# Patient Record
Sex: FEMALE | Race: WHITE | Employment: UNEMPLOYED | URBAN - METROPOLITAN AREA
[De-identification: names, ages, dates, MRNs, and addresses within clinical notes are randomized per-mention and may not be internally consistent; named-entity substitution may affect disease eponyms.]

---

## 2023-03-29 ENCOUNTER — APPOINTMENT (OUTPATIENT)
Dept: CT IMAGING | Age: 78
End: 2023-03-29
Payer: COMMERCIAL

## 2023-03-29 ENCOUNTER — APPOINTMENT (OUTPATIENT)
Dept: GENERAL RADIOLOGY | Age: 78
End: 2023-03-29
Payer: COMMERCIAL

## 2023-03-29 ENCOUNTER — HOSPITAL ENCOUNTER (OUTPATIENT)
Age: 78
Setting detail: OBSERVATION
Discharge: HOME | End: 2023-03-31
Attending: EMERGENCY MEDICINE | Admitting: INTERNAL MEDICINE
Payer: COMMERCIAL

## 2023-03-29 DIAGNOSIS — U07.1 COVID: Primary | ICD-10-CM

## 2023-03-29 LAB
ALBUMIN SERPL-MCNC: 4.3 G/DL (ref 3.5–5.2)
ALP SERPL-CCNC: 58 U/L (ref 35–104)
ALT SERPL-CCNC: 16 U/L (ref 0–32)
AMPHET UR QL SCN: NOT DETECTED
ANION GAP SERPL CALCULATED.3IONS-SCNC: 11 MMOL/L (ref 7–16)
APAP SERPL-MCNC: <5 MCG/ML (ref 10–30)
AST SERPL-CCNC: 20 U/L (ref 0–31)
BACTERIA URNS QL MICRO: ABNORMAL /HPF
BARBITURATES UR QL SCN: NOT DETECTED
BASOPHILS # BLD: 0.01 E9/L (ref 0–0.2)
BASOPHILS NFR BLD: 0.1 % (ref 0–2)
BENZODIAZ UR QL SCN: NOT DETECTED
BILIRUB SERPL-MCNC: 0.7 MG/DL (ref 0–1.2)
BILIRUB UR QL STRIP: NEGATIVE
BUN SERPL-MCNC: 10 MG/DL (ref 6–23)
CALCIUM SERPL-MCNC: 9.3 MG/DL (ref 8.6–10.2)
CANNABINOIDS UR QL SCN: NOT DETECTED
CHLORIDE SERPL-SCNC: 103 MMOL/L (ref 98–107)
CLARITY UR: CLEAR
CO2 SERPL-SCNC: 26 MMOL/L (ref 22–29)
COCAINE UR QL SCN: NOT DETECTED
COLOR UR: YELLOW
CREAT SERPL-MCNC: 0.6 MG/DL (ref 0.5–1)
DRUG SCREEN COMMENT UR-IMP: NORMAL
EKG ATRIAL RATE: 83 BPM
EKG P AXIS: 141 DEGREES
EKG P-R INTERVAL: 132 MS
EKG Q-T INTERVAL: 354 MS
EKG QRS DURATION: 90 MS
EKG QTC CALCULATION (BAZETT): 415 MS
EKG R AXIS: 125 DEGREES
EKG T AXIS: 139 DEGREES
EKG VENTRICULAR RATE: 83 BPM
EOSINOPHIL # BLD: 0.01 E9/L (ref 0.05–0.5)
EOSINOPHIL NFR BLD: 0.1 % (ref 0–6)
ERYTHROCYTE [DISTWIDTH] IN BLOOD BY AUTOMATED COUNT: 12.8 FL (ref 11.5–15)
ETHANOLAMINE SERPL-MCNC: <10 MG/DL (ref 0–0.08)
FENTANYL SCREEN, URINE: NOT DETECTED
GLUCOSE BLD-MCNC: 96 MG/DL
GLUCOSE SERPL-MCNC: 96 MG/DL (ref 74–99)
GLUCOSE UR STRIP-MCNC: NEGATIVE MG/DL
HCT VFR BLD AUTO: 38.4 % (ref 34–48)
HGB BLD-MCNC: 13.2 G/DL (ref 11.5–15.5)
HGB UR QL STRIP: NEGATIVE
IMM GRANULOCYTES # BLD: 0.04 E9/L
IMM GRANULOCYTES NFR BLD: 0.5 % (ref 0–5)
INFLUENZA A BY PCR: NOT DETECTED
INFLUENZA B BY PCR: NOT DETECTED
KETONES UR STRIP-MCNC: 15 MG/DL
LACTATE BLDV-SCNC: 1.4 MMOL/L (ref 0.5–2.2)
LEUKOCYTE ESTERASE UR QL STRIP: ABNORMAL
LYMPHOCYTES # BLD: 0.9 E9/L (ref 1.5–4)
LYMPHOCYTES NFR BLD: 12 % (ref 20–42)
MCH RBC QN AUTO: 30.8 PG (ref 26–35)
MCHC RBC AUTO-ENTMCNC: 34.4 % (ref 32–34.5)
MCV RBC AUTO: 89.7 FL (ref 80–99.9)
METHADONE UR QL SCN: NOT DETECTED
MONOCYTES # BLD: 0.48 E9/L (ref 0.1–0.95)
MONOCYTES NFR BLD: 6.4 % (ref 2–12)
NEUTROPHILS # BLD: 6.08 E9/L (ref 1.8–7.3)
NEUTS SEG NFR BLD: 80.9 % (ref 43–80)
NITRITE UR QL STRIP: NEGATIVE
OPIATES UR QL SCN: NOT DETECTED
OXYCODONE URINE: NOT DETECTED
PCP UR QL SCN: NOT DETECTED
PH UR STRIP: 7 [PH] (ref 5–9)
PLATELET # BLD AUTO: 204 E9/L (ref 130–450)
PMV BLD AUTO: 9.9 FL (ref 7–12)
POTASSIUM SERPL-SCNC: 3.6 MMOL/L (ref 3.5–5)
PROT SERPL-MCNC: 7.4 G/DL (ref 6.4–8.3)
PROT UR STRIP-MCNC: NEGATIVE MG/DL
RBC # BLD AUTO: 4.28 E12/L (ref 3.5–5.5)
RBC #/AREA URNS HPF: ABNORMAL /HPF (ref 0–2)
SALICYLATES SERPL-MCNC: <0.3 MG/DL (ref 0–30)
SARS-COV-2 RDRP RESP QL NAA+PROBE: DETECTED
SODIUM SERPL-SCNC: 140 MMOL/L (ref 132–146)
SP GR UR STRIP: <=1.005 (ref 1–1.03)
TRICYCLIC ANTIDEPRESSANTS SCREEN SERUM: NEGATIVE NG/ML
TROPONIN, HIGH SENSITIVITY: 19 NG/L (ref 0–9)
TROPONIN, HIGH SENSITIVITY: 19 NG/L (ref 0–9)
UROBILINOGEN UR STRIP-ACNC: 0.2 E.U./DL
WBC # BLD: 7.5 E9/L (ref 4.5–11.5)
WBC #/AREA URNS HPF: ABNORMAL /HPF (ref 0–5)

## 2023-03-29 PROCEDURE — 87635 SARS-COV-2 COVID-19 AMP PRB: CPT

## 2023-03-29 PROCEDURE — 87502 INFLUENZA DNA AMP PROBE: CPT

## 2023-03-29 PROCEDURE — 85025 COMPLETE CBC W/AUTO DIFF WBC: CPT

## 2023-03-29 PROCEDURE — 93010 ELECTROCARDIOGRAM REPORT: CPT | Performed by: INTERNAL MEDICINE

## 2023-03-29 PROCEDURE — 71045 X-RAY EXAM CHEST 1 VIEW: CPT

## 2023-03-29 PROCEDURE — 84484 ASSAY OF TROPONIN QUANT: CPT

## 2023-03-29 PROCEDURE — 83605 ASSAY OF LACTIC ACID: CPT

## 2023-03-29 PROCEDURE — 70450 CT HEAD/BRAIN W/O DYE: CPT

## 2023-03-29 PROCEDURE — 80053 COMPREHEN METABOLIC PANEL: CPT

## 2023-03-29 PROCEDURE — 81001 URINALYSIS AUTO W/SCOPE: CPT

## 2023-03-29 PROCEDURE — 36415 COLL VENOUS BLD VENIPUNCTURE: CPT

## 2023-03-29 PROCEDURE — 80179 DRUG ASSAY SALICYLATE: CPT

## 2023-03-29 PROCEDURE — 82077 ASSAY SPEC XCP UR&BREATH IA: CPT

## 2023-03-29 PROCEDURE — 80143 DRUG ASSAY ACETAMINOPHEN: CPT

## 2023-03-29 PROCEDURE — 93005 ELECTROCARDIOGRAM TRACING: CPT

## 2023-03-29 PROCEDURE — 99285 EMERGENCY DEPT VISIT HI MDM: CPT

## 2023-03-29 PROCEDURE — G0378 HOSPITAL OBSERVATION PER HR: HCPCS

## 2023-03-29 PROCEDURE — 80307 DRUG TEST PRSMV CHEM ANLYZR: CPT

## 2023-03-29 RX ORDER — ACETAMINOPHEN 500 MG
500 TABLET ORAL EVERY 4 HOURS PRN
Status: DISCONTINUED | OUTPATIENT
Start: 2023-03-29 | End: 2023-03-31 | Stop reason: HOSPADM

## 2023-03-29 RX ORDER — LANOLIN ALCOHOL/MO/W.PET/CERES
3 CREAM (GRAM) TOPICAL NIGHTLY PRN
Status: DISCONTINUED | OUTPATIENT
Start: 2023-03-29 | End: 2023-03-31 | Stop reason: HOSPADM

## 2023-03-29 ASSESSMENT — ENCOUNTER SYMPTOMS
VOICE CHANGE: 0
TROUBLE SWALLOWING: 0
NAUSEA: 0
PHOTOPHOBIA: 0
RHINORRHEA: 1
ABDOMINAL PAIN: 0
VOMITING: 0
DIARRHEA: 0
SHORTNESS OF BREATH: 0
WHEEZING: 0
COUGH: 1
BACK PAIN: 0

## 2023-03-29 NOTE — ED NOTES
Charge nurse Latisha Russ RN and Resident Dr. Abel Soliz notified of Pt being positive for COVID-19 and needing a room in the ED. Charge nurse stating no rooms are available  at the moment and they will notify this RN when a room is available. Isolation precautions taken at this moment.       Desean Herbert RN  03/29/23 5438

## 2023-03-29 NOTE — ED NOTES
Pt denying SI and HI. Stating she is confused and that's why she needs help. Does not know how to use her phone and computer that she's been using for years. Pt ANO x 2.       Polo Winn RN  03/29/23 1113

## 2023-03-29 NOTE — ED NOTES
Ambulated Pt in hallway. Stable gait. No signs of distress or SOB. Pt dizzy upon standing for 10 seconds then no dizzy anymore. Educated Pt on sitting on edge of the bed for 5 minutes then standing. HR  SpO2 96-98% RA while ambulating.       Kingston Corral RN  03/29/23 6942

## 2023-03-29 NOTE — ED PROVIDER NOTES
SCREEN   Result Value Ref Range    Amphetamine Screen, Urine NOT DETECTED Negative <1000 ng/mL    Barbiturate Screen, Ur NOT DETECTED Negative < 200 ng/mL    Benzodiazepine Screen, Urine NOT DETECTED Negative < 200 ng/mL    Cannabinoid Scrn, Ur NOT DETECTED Negative < 50ng/mL    Cocaine Metabolite Screen, Urine NOT DETECTED Negative < 300 ng/mL    Opiate Scrn, Ur NOT DETECTED Negative < 300ng/mL    PCP Screen, Urine NOT DETECTED Negative < 25 ng/mL    Methadone Screen, Urine NOT DETECTED Negative <300 ng/mL    Oxycodone Urine NOT DETECTED Negative <100 ng/mL    FENTANYL SCREEN, URINE NOT DETECTED Negative <1 ng/mL    Drug Screen Comment: see below    Serum Drug Screen   Result Value Ref Range    Ethanol Lvl <10 mg/dL    Acetaminophen Level <5.0 (L) 10.0 - 36.5 mcg/mL    Salicylate, Serum <4.0 0.0 - 30.0 mg/dL    TCA Scrn NEGATIVE Cutoff:300 ng/mL   Troponin   Result Value Ref Range    Troponin, High Sensitivity 19 (H) 0 - 9 ng/L   Urinalysis   Result Value Ref Range    Color, UA Yellow Straw/Yellow    Clarity, UA Clear Clear    Glucose, Ur Negative Negative mg/dL    Bilirubin Urine Negative Negative    Ketones, Urine 15 (A) Negative mg/dL    Specific Gravity, UA <=1.005 1.005 - 1.030    Blood, Urine Negative Negative    pH, UA 7.0 5.0 - 9.0    Protein, UA Negative Negative mg/dL    Urobilinogen, Urine 0.2 <2.0 E.U./dL    Nitrite, Urine Negative Negative    Leukocyte Esterase, Urine MODERATE (A) Negative   Lactic Acid   Result Value Ref Range    Lactic Acid 1.4 0.5 - 2.2 mmol/L   Microscopic Urinalysis   Result Value Ref Range    WBC, UA 2-5 0 - 5 /HPF    RBC, UA NONE 0 - 2 /HPF    Bacteria, UA RARE (A) None Seen /HPF   Troponin   Result Value Ref Range    Troponin, High Sensitivity 19 (H) 0 - 9 ng/L   POCT Glucose   Result Value Ref Range    Glucose 96 mg/dL   EKG 12 Lead   Result Value Ref Range    Ventricular Rate 83 BPM    Atrial Rate 83 BPM    P-R Interval 132 ms    QRS Duration 90 ms    Q-T Interval 354 ms QTc Calculation (Bazett) 415 ms    P Axis 141 degrees    R Axis 125 degrees    T Axis 139 degrees       RADIOLOGY:  CT Head W/O Contrast   Final Result   No acute intracranial abnormality. Mild cerebral atrophy, likely age related. Moderate periventricular and subcortical white matter hypodensity consistent   with demyelination/dysmyelination and/or microangiopathic ischemic changes of   aging. Probable remote lacunar infarct left basal ganglia. Minimal maxillary and left sphenoid mucosal sinus disease. XR CHEST PORTABLE   Final Result   No acute process. ------------------------- NURSING NOTES AND VITALS REVIEWED ---------------------------  Date / Time Roomed:  3/29/2023 10:54 AM  ED Bed Assignment:  10/10    The nursing notes within the ED encounter and vital signs as below have been reviewed. Patient Vitals for the past 24 hrs:   BP Temp Pulse Resp SpO2 Height Weight   03/29/23 2300 (!) 146/85 -- 79 18 98 % -- --   03/29/23 1915 (!) 142/81 -- 80 18 98 % -- --   03/29/23 1833 (!) 142/96 -- 88 18 98 % -- --   03/29/23 1334 -- -- -- -- -- 5' 3\" (1.6 m) 120 lb (54.4 kg)   03/29/23 1333 (!) 153/82 -- 87 18 98 % -- --   03/29/23 1115 (!) 176/88 97.8 °F (36.6 °C) 86 14 96 % -- --       Oxygen Saturation Interpretation: Normal    ------------------------------------------ PROGRESS NOTES ------------------------------------------  Re-evaluation(s):  Patients symptoms show no change  Repeat physical examination is not changed    Counseling:  I have spoken with the patient and discussed todays results, in addition to providing specific details for the plan of care and counseling regarding the diagnosis and prognosis. Their questions are answered at this time and they are agreeable with the plan of admission.    --------------------------------- ADDITIONAL PROVIDER NOTES ---------------------------------  Consultations:  Spoke with Dr. Erna Munguia. Discussed case.   They will

## 2023-03-29 NOTE — ED NOTES
Attempts:  [] Reports:   [x] Denies    C-SSRS Screening Completed by RN: Current Suicide Risk:  [x] No Risk [] Low [] Moderate [] High    Homicidal Ideations:  [] Reports:   [] Past [] Present   [x] Denies     Self Injurious/Self Mutilation Behaviors:  [] Reports:    [] Past [] Present   [] Denies    Hallucinations/Delusions:  [x] Reports:   possibly  [] Denies     Substance Use/Alcohol Use/Addiction:  [] Reports:   [x] Denies   [x] SBIRT Screen Complete. Current or Past Substance Abuse Treatment:  [] Yes, When and Where:  [x] No    Current or Past Mental Health Treatment:  [] Yes, When and Where:  [x] No    Legal Issues:  []  Yes (Specify)  [x]  No    Access to Weapons:  []  Yes (Specify)  [x]  No    Trauma History:  [] Reports:  [x] Denies     Living Situation:  alone at home     Employment:  retired    Education Level:  college    Violence Risk Screening:        Have you ever thought about hurting someone? [x]  No  []  Yes (Ask the questions listed below)   When? Did you follow through with the thoughts? [] No     [] Yes- When and what happened? 2.  Have you ever threatened anyone? [x]  No  []  Yes (Ask the questions listed below)   When and what happened? Have you ever threatened someone with a gun, knife or other weapon? []  No  []  Yes - When and what happened? 2. Have you ever had an order of protection taken out against you? []  Yes [x]  No  3. Have you ever been arrested due to violence? []  Yes [x]  No  4. Have you ever been cruel to animals?  []  Yes [x]  No    After consideration of C-SSRS screening results, C-SSRS assessments, and this professional's assessment the patient's overall suicide risk assessed to be:  [x] No Risk  [] Low   [] Moderate   [] High     [x] Discussed current suicide risk, protective and risk factors with RN and ED Physician     Disposition:  [] Home:   [] Outpatient Provider:   [] Crisis Unit:   [] Inpatient Psychiatric Unit:  [x] Other: pt is being medically evaluated                    Graylon Jacoby, W  03/29/23 6534

## 2023-03-30 PROCEDURE — G0378 HOSPITAL OBSERVATION PER HR: HCPCS

## 2023-03-30 NOTE — H&P
°C) (Temporal)   Resp 16   Ht 5' 3\" (1.6 m)   Wt 120 lb (54.4 kg)   SpO2 99%   BMI 21.26 kg/m²     General:  Awake, alert, oriented X 3. No acute distress noted  She is somewhat thin built  Answered all questions appropriately  HEENT:  Normocephalic, atraumatic. Pupils equal, round, reactive to light. No scleral icterus. No conjunctival injection. No nasal discharge. Neck:  Supple  Heart:  RRR, no murmurs, gallops, rubs  Lungs:  CTA bilaterally, bilat symmetrical expansion, no wheeze, rales, or rhonchi  Abdomen:   Bowel sounds present, soft, nontender, no masses, no organomegaly, no peritoneal signs  Extremities:  No clubbing, cyanosis, or edema  Skin:  Warm and dry, no open lesions or rash  Neuro:  Cranial nerves 2-12 intact, no focal deficits  Breast: deferred  Rectal: deferred  Genitalia:  deferred    LABS:        ASSESSMENT:      Principal Problem:    COVID-19  Altered mental status likely from worsening dementia        PLAN:  No treatment required for COVID  Psychiatric consult  DC planning  Questions answered to her satisfaction    Maira Bellamy MD  1:27 PM  3/30/2023

## 2023-03-30 NOTE — CARE COORDINATION
Care coordination:  69year old admitted with altered mental status after found outside her home confused. Tested positive for COVID. Place in observation . SW spoke to daughter Elly Anguiano  [phone] . [place] [facility] [address] now. She is medical POA. She reports patient was diagnosed with temporal frontal dementia in 2021 with symptoms worsening over last 6-9 months. She is physically independent. Friends check on her regularly. Patient does not drive. No DME or DANG or HHC in [facility]. She follows at [place] [address] of neurology. Her local PCP is Lydia Price. 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 Patient son is financial POA and lives in [place]. Another daughter lives in [place]. Family have decided that patient can no longer live alone. They are in process of seeking  placement in a memory care unit possibly with SOV. They will take the patient home at discharge and provide 24 hour supervision until placed. Daughter was provided private duty care giver list .    Will follow.

## 2023-03-30 NOTE — CONSULTS
Patient chart reviewed. She is being treated by [facility] neurology who diagnosed the patient with frontotemporal dementia, will defer to their expertise. Agree with social work plan for memory care and discharge home with 24/7 care. Please re-consult if there is any acute need for inpatient psychiatric intervention; if the patient is suicidal, homicidal psychotic or manic.

## 2023-03-31 VITALS
RESPIRATION RATE: 16 BRPM | OXYGEN SATURATION: 100 % | HEART RATE: 88 BPM | SYSTOLIC BLOOD PRESSURE: 160 MMHG | HEIGHT: 63 IN | TEMPERATURE: 96.8 F | WEIGHT: 120 LBS | DIASTOLIC BLOOD PRESSURE: 88 MMHG | BODY MASS INDEX: 21.26 KG/M2

## 2023-03-31 PROCEDURE — G0378 HOSPITAL OBSERVATION PER HR: HCPCS

## 2023-03-31 NOTE — PROGRESS NOTES
Discussed AVS with pt and daughter. All questions answered. PIV removed and pt tolerated well.
Rina barajas served Psych consult.
Current  Protein (g/day): 1.2-1.4g/kgxCBW=65-75g  Method Used for Fluid Requirements: 1 ml/kcal  Fluid (ml/day): 7513-4966    Nutrition Diagnosis:   Inadequate oral intake related to cognitive or neurological impairment as evidenced by intake 0-25%, poor intake prior to admission    Nutrition Interventions:   Food and/or Nutrient Delivery: Continue Current Diet, Start Oral Nutrition Supplement (Ensure BID ; magic cup once/day)  Nutrition Education/Counseling: Education not indicated  Coordination of Nutrition Care: Continue to monitor while inpatient       Goals:     Goals: PO intake 50% or greater, by next RD assessment       Nutrition Monitoring and Evaluation:   Behavioral-Environmental Outcomes: None Identified  Food/Nutrient Intake Outcomes: Food and Nutrient Intake, Supplement Intake  Physical Signs/Symptoms Outcomes: Biochemical Data, Nutrition Focused Physical Findings, Skin, Weight, Chewing or Swallowing, GI Status, Fluid Status or Edema    Discharge Planning:     Too soon to determine     Tariq GRICELDA Pan, LD  Contact: [phone]

## 2024-01-01 ENCOUNTER — APPOINTMENT (OUTPATIENT)
Dept: GENERAL RADIOLOGY | Age: 79
DRG: 884 | End: 2024-01-01
Payer: MEDICARE

## 2024-01-01 ENCOUNTER — HOSPITAL ENCOUNTER (INPATIENT)
Age: 79
LOS: 2 days | Discharge: NURSING, REHAB OR LONG-TERM CARE | DRG: 884 | End: 2024-10-16
Attending: EMERGENCY MEDICINE | Admitting: FAMILY MEDICINE
Payer: MEDICARE

## 2024-01-01 ENCOUNTER — APPOINTMENT (OUTPATIENT)
Dept: MRI IMAGING | Age: 79
DRG: 884 | End: 2024-01-01
Payer: MEDICARE

## 2024-01-01 ENCOUNTER — APPOINTMENT (OUTPATIENT)
Dept: CT IMAGING | Age: 79
DRG: 884 | End: 2024-01-01
Payer: MEDICARE

## 2024-01-01 VITALS
HEIGHT: 63 IN | DIASTOLIC BLOOD PRESSURE: 90 MMHG | HEART RATE: 93 BPM | RESPIRATION RATE: 18 BRPM | TEMPERATURE: 97 F | OXYGEN SATURATION: 98 % | BODY MASS INDEX: 18.09 KG/M2 | WEIGHT: 102.1 LBS | SYSTOLIC BLOOD PRESSURE: 168 MMHG

## 2024-01-01 DIAGNOSIS — S09.90XA INJURY OF HEAD, INITIAL ENCOUNTER: Primary | ICD-10-CM
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
## 2024-01-01 DIAGNOSIS — Z86.59 HISTORY OF DEMENTIA: ICD-10-CM

## 2024-01-01 LAB
ALBUMIN SERPL-MCNC: 4.4 G/DL (ref 3.5–5.2)
ALP SERPL-CCNC: 71 U/L (ref 35–104)
ALT SERPL-CCNC: 16 U/L (ref 0–32)
ANION GAP SERPL CALCULATED.3IONS-SCNC: 10 MMOL/L (ref 7–16)
ANION GAP SERPL CALCULATED.3IONS-SCNC: 11 MMOL/L (ref 7–16)
ANION GAP SERPL CALCULATED.3IONS-SCNC: 13 MMOL/L (ref 7–16)
APPEARANCE CSF: CLEAR
APPEARANCE CSF: CLEAR
AST SERPL-CCNC: 28 U/L (ref 0–31)
BACTERIA URNS QL MICRO: ABNORMAL
BASOPHILS # BLD: 0.03 K/UL (ref 0–0.2)
BASOPHILS # BLD: 0.04 K/UL (ref 0–0.2)
BASOPHILS # BLD: 0.06 K/UL (ref 0–0.2)
BASOPHILS NFR BLD: 0 % (ref 0–2)
BASOPHILS NFR BLD: 0 % (ref 0–2)
BASOPHILS NFR BLD: 1 % (ref 0–2)
BILIRUB SERPL-MCNC: 0.6 MG/DL (ref 0–1.2)
BILIRUB UR QL STRIP: NEGATIVE
BUN SERPL-MCNC: 16 MG/DL (ref 6–23)
BUN SERPL-MCNC: 17 MG/DL (ref 6–23)
BUN SERPL-MCNC: 20 MG/DL (ref 6–23)
CALCIUM SERPL-MCNC: 10.2 MG/DL (ref 8.6–10.2)
CALCIUM SERPL-MCNC: 9.2 MG/DL (ref 8.6–10.2)
CALCIUM SERPL-MCNC: 9.9 MG/DL (ref 8.6–10.2)
CHLORIDE SERPL-SCNC: 101 MMOL/L (ref 98–107)
CHLORIDE SERPL-SCNC: 104 MMOL/L (ref 98–107)
CHLORIDE SERPL-SCNC: 107 MMOL/L (ref 98–107)
CLARITY UR: CLEAR
CLOT CHECK: ABNORMAL
CLOT CHECK: NORMAL
CO2 SERPL-SCNC: 22 MMOL/L (ref 22–29)
CO2 SERPL-SCNC: 22 MMOL/L (ref 22–29)
CO2 SERPL-SCNC: 24 MMOL/L (ref 22–29)
COLOR CSF: COLORLESS
COLOR CSF: COLORLESS
COLOR UR: YELLOW
CREAT SERPL-MCNC: 0.8 MG/DL (ref 0.5–1)
CREAT SERPL-MCNC: 0.8 MG/DL (ref 0.5–1)
CREAT SERPL-MCNC: 1 MG/DL (ref 0.5–1)
CRP SERPL HS-MCNC: <3 MG/L (ref 0–5)
CRYSTALS URNS MICRO: ABNORMAL /HPF
EKG ATRIAL RATE: 106 BPM
EKG ATRIAL RATE: 108 BPM
EKG P AXIS: 47 DEGREES
EKG P AXIS: 92 DEGREES
EKG P-R INTERVAL: 134 MS
EKG P-R INTERVAL: 148 MS
EKG Q-T INTERVAL: 328 MS
EKG Q-T INTERVAL: 334 MS
EKG QRS DURATION: 70 MS
EKG QRS DURATION: 76 MS
EKG QTC CALCULATION (BAZETT): 435 MS
EKG QTC CALCULATION (BAZETT): 447 MS
EKG R AXIS: 47 DEGREES
EKG R AXIS: 51 DEGREES
EKG T AXIS: -9 DEGREES
EKG T AXIS: 12 DEGREES
EKG VENTRICULAR RATE: 106 BPM
EKG VENTRICULAR RATE: 108 BPM
EOSINOPHIL # BLD: 0.09 K/UL (ref 0.05–0.5)
EOSINOPHIL # BLD: 0.17 K/UL (ref 0.05–0.5)
EOSINOPHIL # BLD: 0.18 K/UL (ref 0.05–0.5)
EOSINOPHILS RELATIVE PERCENT: 1 % (ref 0–6)
EOSINOPHILS RELATIVE PERCENT: 1 % (ref 0–6)
EOSINOPHILS RELATIVE PERCENT: 2 % (ref 0–6)
ERYTHROCYTE [DISTWIDTH] IN BLOOD BY AUTOMATED COUNT: 13.1 % (ref 11.5–15)
GFR, ESTIMATED: 61 ML/MIN/1.73M2
GFR, ESTIMATED: 71 ML/MIN/1.73M2
GFR, ESTIMATED: 80 ML/MIN/1.73M2
GLUCOSE CSF-MCNC: 67 MG/DL (ref 40–70)
GLUCOSE SERPL-MCNC: 112 MG/DL (ref 74–99)
GLUCOSE SERPL-MCNC: 121 MG/DL (ref 74–99)
GLUCOSE SERPL-MCNC: 122 MG/DL (ref 74–99)
GLUCOSE UR STRIP-MCNC: NEGATIVE MG/DL
HCT VFR BLD AUTO: 36.3 % (ref 34–48)
HCT VFR BLD AUTO: 37 % (ref 34–48)
HCT VFR BLD AUTO: 38.2 % (ref 34–48)
HGB BLD-MCNC: 12.2 G/DL (ref 11.5–15.5)
HGB BLD-MCNC: 12.2 G/DL (ref 11.5–15.5)
HGB BLD-MCNC: 12.7 G/DL (ref 11.5–15.5)
HGB UR QL STRIP.AUTO: NEGATIVE
HSV 1 SUBTYPE BY PCR: NOT DETECTED
HSV 2 SUBTYPE BY PCR: NOT DETECTED
HSV SOURCE: NORMAL
IMM GRANULOCYTES # BLD AUTO: 0.05 K/UL (ref 0–0.58)
IMM GRANULOCYTES # BLD AUTO: 0.06 K/UL (ref 0–0.58)
IMM GRANULOCYTES # BLD AUTO: 0.06 K/UL (ref 0–0.58)
IMM GRANULOCYTES NFR BLD: 0 % (ref 0–5)
IMM GRANULOCYTES NFR BLD: 0 % (ref 0–5)
IMM GRANULOCYTES NFR BLD: 1 % (ref 0–5)
INR PPP: 1
KETONES UR STRIP-MCNC: ABNORMAL MG/DL
LACTATE BLDV-SCNC: 2.3 MMOL/L (ref 0.5–2.2)
LEUKOCYTE ESTERASE UR QL STRIP: NEGATIVE
LIPASE SERPL-CCNC: 33 U/L (ref 13–60)
LITHIUM DATE LAST DOSE: NORMAL
LITHIUM DOSE AMOUNT: NORMAL
LITHIUM DOSE TIME: NORMAL
LITHIUM LEVEL: 0.9 MMOL/L (ref 0.5–1.5)
LYMPHOCYTES NFR BLD: 0.88 K/UL (ref 1.5–4)
LYMPHOCYTES NFR BLD: 1.29 K/UL (ref 1.5–4)
LYMPHOCYTES NFR BLD: 1.49 K/UL (ref 1.5–4)
LYMPHOCYTES RELATIVE PERCENT: 10 % (ref 20–42)
LYMPHOCYTES RELATIVE PERCENT: 11 % (ref 20–42)
LYMPHOCYTES RELATIVE PERCENT: 8 % (ref 20–42)
MCH RBC QN AUTO: 30.7 PG (ref 26–35)
MCH RBC QN AUTO: 30.8 PG (ref 26–35)
MCH RBC QN AUTO: 31.4 PG (ref 26–35)
MCHC RBC AUTO-ENTMCNC: 33 G/DL (ref 32–34.5)
MCHC RBC AUTO-ENTMCNC: 33.2 G/DL (ref 32–34.5)
MCHC RBC AUTO-ENTMCNC: 33.6 G/DL (ref 32–34.5)
MCV RBC AUTO: 92.3 FL (ref 80–99.9)
MCV RBC AUTO: 93.3 FL (ref 80–99.9)
MCV RBC AUTO: 93.4 FL (ref 80–99.9)
MICROORGANISM SPEC CULT: NO GROWTH
MICROORGANISM/AGENT SPEC: NORMAL
MONOCYTES NFR BLD: 0.49 K/UL (ref 0.1–0.95)
MONOCYTES NFR BLD: 0.72 K/UL (ref 0.1–0.95)
MONOCYTES NFR BLD: 0.76 K/UL (ref 0.1–0.95)
MONOCYTES NFR BLD: 4 % (ref 2–12)
MONOCYTES NFR BLD: 6 % (ref 2–12)
MONOCYTES NFR BLD: 6 % (ref 2–12)
MONOCYTES, CSF: 50 % (ref 15–45)
NEUTROPHILS NFR BLD: 82 % (ref 43–80)
NEUTROPHILS NFR BLD: 82 % (ref 43–80)
NEUTROPHILS NFR BLD: 86 % (ref 43–80)
NEUTROPHILS NFR CSF: 50 % (ref 0–6)
NEUTS SEG NFR BLD: 10.42 K/UL (ref 1.8–7.3)
NEUTS SEG NFR BLD: 11.02 K/UL (ref 1.8–7.3)
NEUTS SEG NFR BLD: 9.55 K/UL (ref 1.8–7.3)
NITRITE UR QL STRIP: NEGATIVE
NON-GYN CYTOLOGY REPORT: NORMAL
NUC CELL # FLD MANUAL: 10 CELLS/UL (ref 0–5)
NUC CELL # FLD MANUAL: <3 CELLS/UL (ref 0–5)
PH UR STRIP: 6 [PH] (ref 5–9)
PLATELET # BLD AUTO: 296 K/UL (ref 130–450)
PLATELET # BLD AUTO: 296 K/UL (ref 130–450)
PLATELET # BLD AUTO: 297 K/UL (ref 130–450)
PMV BLD AUTO: 9.4 FL (ref 7–12)
PMV BLD AUTO: 9.7 FL (ref 7–12)
PMV BLD AUTO: 9.8 FL (ref 7–12)
POTASSIUM SERPL-SCNC: 4.1 MMOL/L (ref 3.5–5)
POTASSIUM SERPL-SCNC: 4.2 MMOL/L (ref 3.5–5)
POTASSIUM SERPL-SCNC: 4.4 MMOL/L (ref 3.5–5)
PROCALCITONIN SERPL-MCNC: 0.08 NG/ML (ref 0–0.08)
PROT CSF-MCNC: 52.7 MG/DL (ref 15–40)
PROT CSF-MCNC: 58.2 MG/DL (ref 15–40)
PROT SERPL-MCNC: 7.7 G/DL (ref 6.4–8.3)
PROT UR STRIP-MCNC: ABNORMAL MG/DL
PROTHROMBIN TIME: 11.2 SEC (ref 9.3–12.4)
RBC # BLD AUTO: 3.89 M/UL (ref 3.5–5.5)
RBC # BLD AUTO: 3.96 M/UL (ref 3.5–5.5)
RBC # BLD AUTO: 4.14 M/UL (ref 3.5–5.5)
RBC # FLD MANUAL: <2000 CELLS/UL
RBC # FLD MANUAL: <2000 CELLS/UL
RBC #/AREA URNS HPF: ABNORMAL /HPF
SODIUM SERPL-SCNC: 133 MMOL/L (ref 132–146)
SODIUM SERPL-SCNC: 139 MMOL/L (ref 132–146)
SODIUM SERPL-SCNC: 142 MMOL/L (ref 132–146)
SP GR UR STRIP: >1.03 (ref 1–1.03)
SPECIMEN DESCRIPTION: NORMAL
SPECIMEN VOL CSF: 12 ML
SPECIMEN VOL CSF: 12 ML
TSH SERPL DL<=0.05 MIU/L-ACNC: 2.19 UIU/ML (ref 0.27–4.2)
TUBE # CSF: 1
TUBE # CSF: 3
UROBILINOGEN UR STRIP-ACNC: 0.2 EU/DL (ref 0–1)
VDRL CSF QL: NONREACTIVE
WBC #/AREA URNS HPF: ABNORMAL /HPF
WBC OTHER # BLD: 11.2 K/UL (ref 4.5–11.5)
WBC OTHER # BLD: 12.7 K/UL (ref 4.5–11.5)
WBC OTHER # BLD: 13.5 K/UL (ref 4.5–11.5)

## 2024-01-01 PROCEDURE — 82945 GLUCOSE OTHER FLUID: CPT

## 2024-01-01 PROCEDURE — 6370000000 HC RX 637 (ALT 250 FOR IP): Performed by: INTERNAL MEDICINE

## 2024-01-01 PROCEDURE — 85025 COMPLETE CBC W/AUTO DIFF WBC: CPT

## 2024-01-01 PROCEDURE — 6370000000 HC RX 637 (ALT 250 FOR IP): Performed by: FAMILY MEDICINE

## 2024-01-01 PROCEDURE — 36415 COLL VENOUS BLD VENIPUNCTURE: CPT

## 2024-01-01 PROCEDURE — 96372 THER/PROPH/DIAG INJ SC/IM: CPT

## 2024-01-01 PROCEDURE — 86140 C-REACTIVE PROTEIN: CPT

## 2024-01-01 PROCEDURE — 85610 PROTHROMBIN TIME: CPT

## 2024-01-01 PROCEDURE — 99232 SBSQ HOSP IP/OBS MODERATE 35: CPT | Performed by: INTERNAL MEDICINE

## 2024-01-01 PROCEDURE — 93010 ELECTROCARDIOGRAM REPORT: CPT | Performed by: INTERNAL MEDICINE

## 2024-01-01 PROCEDURE — G0378 HOSPITAL OBSERVATION PER HR: HCPCS

## 2024-01-01 PROCEDURE — 93005 ELECTROCARDIOGRAM TRACING: CPT

## 2024-01-01 PROCEDURE — 89051 BODY FLUID CELL COUNT: CPT

## 2024-01-01 PROCEDURE — 6360000002 HC RX W HCPCS: Performed by: INTERNAL MEDICINE

## 2024-01-01 PROCEDURE — 97535 SELF CARE MNGMENT TRAINING: CPT

## 2024-01-01 PROCEDURE — 2580000003 HC RX 258: Performed by: INTERNAL MEDICINE

## 2024-01-01 PROCEDURE — 89050 BODY FLUID CELL COUNT: CPT

## 2024-01-01 PROCEDURE — 99238 HOSP IP/OBS DSCHRG MGMT 30/<: CPT | Performed by: INTERNAL MEDICINE

## 2024-01-01 PROCEDURE — B01B1ZZ FLUOROSCOPY OF SPINAL CORD USING LOW OSMOLAR CONTRAST: ICD-10-PCS | Performed by: RADIOLOGY

## 2024-01-01 PROCEDURE — 80048 BASIC METABOLIC PNL TOTAL CA: CPT

## 2024-01-01 PROCEDURE — 1200000000 HC SEMI PRIVATE

## 2024-01-01 PROCEDURE — 99222 1ST HOSP IP/OBS MODERATE 55: CPT | Performed by: FAMILY MEDICINE

## 2024-01-01 PROCEDURE — 88108 CYTOPATH CONCENTRATE TECH: CPT

## 2024-01-01 PROCEDURE — 83605 ASSAY OF LACTIC ACID: CPT

## 2024-01-01 PROCEDURE — 96376 TX/PRO/DX INJ SAME DRUG ADON: CPT

## 2024-01-01 PROCEDURE — 83690 ASSAY OF LIPASE: CPT

## 2024-01-01 PROCEDURE — 2580000003 HC RX 258: Performed by: FAMILY MEDICINE

## 2024-01-01 PROCEDURE — 6360000002 HC RX W HCPCS

## 2024-01-01 PROCEDURE — 6360000002 HC RX W HCPCS: Performed by: FAMILY MEDICINE

## 2024-01-01 PROCEDURE — 72125 CT NECK SPINE W/O DYE: CPT

## 2024-01-01 PROCEDURE — 2709999900 FL LUMBAR PUNCTURE DIAG

## 2024-01-01 PROCEDURE — 99221 1ST HOSP IP/OBS SF/LOW 40: CPT | Performed by: CLINICAL NURSE SPECIALIST

## 2024-01-01 PROCEDURE — 97165 OT EVAL LOW COMPLEX 30 MIN: CPT

## 2024-01-01 PROCEDURE — 87070 CULTURE OTHR SPECIMN AEROBIC: CPT

## 2024-01-01 PROCEDURE — 81001 URINALYSIS AUTO W/SCOPE: CPT

## 2024-01-01 PROCEDURE — 80178 ASSAY OF LITHIUM: CPT

## 2024-01-01 PROCEDURE — 99285 EMERGENCY DEPT VISIT HI MDM: CPT

## 2024-01-01 PROCEDURE — 70450 CT HEAD/BRAIN W/O DYE: CPT

## 2024-01-01 PROCEDURE — 97530 THERAPEUTIC ACTIVITIES: CPT

## 2024-01-01 PROCEDURE — 97161 PT EVAL LOW COMPLEX 20 MIN: CPT

## 2024-01-01 PROCEDURE — 86592 SYPHILIS TEST NON-TREP QUAL: CPT

## 2024-01-01 PROCEDURE — 84145 PROCALCITONIN (PCT): CPT

## 2024-01-01 PROCEDURE — 71045 X-RAY EXAM CHEST 1 VIEW: CPT

## 2024-01-01 PROCEDURE — 84157 ASSAY OF PROTEIN OTHER: CPT

## 2024-01-01 PROCEDURE — 009U3ZX DRAINAGE OF SPINAL CANAL, PERCUTANEOUS APPROACH, DIAGNOSTIC: ICD-10-PCS | Performed by: RADIOLOGY

## 2024-01-01 PROCEDURE — 96375 TX/PRO/DX INJ NEW DRUG ADDON: CPT

## 2024-01-01 PROCEDURE — 70486 CT MAXILLOFACIAL W/O DYE: CPT

## 2024-01-01 PROCEDURE — 96374 THER/PROPH/DIAG INJ IV PUSH: CPT

## 2024-01-01 PROCEDURE — 87205 SMEAR GRAM STAIN: CPT

## 2024-01-01 PROCEDURE — 87529 HSV DNA AMP PROBE: CPT

## 2024-01-01 PROCEDURE — 70551 MRI BRAIN STEM W/O DYE: CPT

## 2024-01-01 PROCEDURE — 62328 DX LMBR SPI PNXR W/FLUOR/CT: CPT

## 2024-01-01 PROCEDURE — 80053 COMPREHEN METABOLIC PANEL: CPT

## 2024-01-01 PROCEDURE — 84443 ASSAY THYROID STIM HORMONE: CPT

## 2024-01-01 RX ORDER — RIVASTIGMINE 13.3 MG/24H
1 PATCH, EXTENDED RELEASE TRANSDERMAL DAILY
Status: DISCONTINUED | OUTPATIENT
Start: 2024-01-01 | End: 2024-01-01 | Stop reason: HOSPADM

## 2024-01-01 RX ORDER — LORAZEPAM 2 MG/ML
1 INJECTION INTRAMUSCULAR
Status: COMPLETED | OUTPATIENT
Start: 2024-01-01 | End: 2024-01-01

## 2024-01-01 RX ORDER — RIVASTIGMINE 13.3 MG/24H
1 PATCH, EXTENDED RELEASE TRANSDERMAL DAILY
COMMUNITY

## 2024-01-01 RX ORDER — HALOPERIDOL 5 MG/ML
1 INJECTION INTRAMUSCULAR EVERY 6 HOURS PRN
Status: DISCONTINUED | OUTPATIENT
Start: 2024-01-01 | End: 2024-01-01 | Stop reason: HOSPADM

## 2024-01-01 RX ORDER — SODIUM CHLORIDE 0.9 % (FLUSH) 0.9 %
5-40 SYRINGE (ML) INJECTION EVERY 12 HOURS SCHEDULED
Status: DISCONTINUED | OUTPATIENT
Start: 2024-01-01 | End: 2024-01-01 | Stop reason: HOSPADM

## 2024-01-01 RX ORDER — SODIUM CHLORIDE 0.9 % (FLUSH) 0.9 %
5-40 SYRINGE (ML) INJECTION PRN
Status: DISCONTINUED | OUTPATIENT
Start: 2024-01-01 | End: 2024-01-01 | Stop reason: HOSPADM

## 2024-01-01 RX ORDER — LORAZEPAM 2 MG/ML
1 INJECTION INTRAMUSCULAR
Status: ACTIVE | OUTPATIENT
Start: 2024-01-01 | End: 2024-01-01

## 2024-01-01 RX ORDER — MEMANTINE HYDROCHLORIDE 10 MG/1
10 TABLET ORAL 2 TIMES DAILY
Status: DISCONTINUED | OUTPATIENT
Start: 2024-01-01 | End: 2024-01-01 | Stop reason: HOSPADM

## 2024-01-01 RX ORDER — MEMANTINE HYDROCHLORIDE 10 MG/1
10 TABLET ORAL 2 TIMES DAILY
COMMUNITY

## 2024-01-01 RX ORDER — ZIPRASIDONE MESYLATE 20 MG/ML
10 INJECTION, POWDER, LYOPHILIZED, FOR SOLUTION INTRAMUSCULAR ONCE
Status: DISCONTINUED | OUTPATIENT
Start: 2024-01-01 | End: 2024-01-01 | Stop reason: HOSPADM

## 2024-01-01 RX ORDER — SODIUM CHLORIDE 9 MG/ML
INJECTION, SOLUTION INTRAVENOUS CONTINUOUS
Status: ACTIVE | OUTPATIENT
Start: 2024-01-01 | End: 2024-01-01

## 2024-01-01 RX ORDER — LOPERAMIDE HYDROCHLORIDE 2 MG/1
2 CAPSULE ORAL EVERY 4 HOURS PRN
COMMUNITY

## 2024-01-01 RX ORDER — ONDANSETRON 2 MG/ML
4 INJECTION INTRAMUSCULAR; INTRAVENOUS EVERY 6 HOURS PRN
Status: DISCONTINUED | OUTPATIENT
Start: 2024-01-01 | End: 2024-01-01 | Stop reason: HOSPADM

## 2024-01-01 RX ORDER — POTASSIUM CHLORIDE 1500 MG/1
40 TABLET, EXTENDED RELEASE ORAL PRN
Status: DISCONTINUED | OUTPATIENT
Start: 2024-01-01 | End: 2024-01-01 | Stop reason: HOSPADM

## 2024-01-01 RX ORDER — LABETALOL HYDROCHLORIDE 5 MG/ML
10 INJECTION, SOLUTION INTRAVENOUS ONCE
Status: COMPLETED | OUTPATIENT
Start: 2024-01-01 | End: 2024-01-01

## 2024-01-01 RX ORDER — LITHIUM CARBONATE 300 MG/1
300 CAPSULE ORAL 2 TIMES DAILY WITH MEALS
Status: DISCONTINUED | OUTPATIENT
Start: 2024-01-01 | End: 2024-01-01 | Stop reason: HOSPADM

## 2024-01-01 RX ORDER — ONDANSETRON 4 MG/1
4 TABLET, ORALLY DISINTEGRATING ORAL EVERY 8 HOURS PRN
Status: DISCONTINUED | OUTPATIENT
Start: 2024-01-01 | End: 2024-01-01 | Stop reason: HOSPADM

## 2024-01-01 RX ORDER — SODIUM CHLORIDE 9 MG/ML
INJECTION, SOLUTION INTRAVENOUS PRN
Status: DISCONTINUED | OUTPATIENT
Start: 2024-01-01 | End: 2024-01-01 | Stop reason: HOSPADM

## 2024-01-01 RX ORDER — LOSARTAN POTASSIUM 25 MG/1
25 TABLET ORAL DAILY
COMMUNITY

## 2024-01-01 RX ORDER — POTASSIUM CHLORIDE 7.45 MG/ML
10 INJECTION INTRAVENOUS PRN
Status: DISCONTINUED | OUTPATIENT
Start: 2024-01-01 | End: 2024-01-01 | Stop reason: HOSPADM

## 2024-01-01 RX ORDER — LITHIUM CARBONATE 300 MG/1
300 CAPSULE ORAL 2 TIMES DAILY WITH MEALS
COMMUNITY

## 2024-01-01 RX ORDER — IBUPROFEN 200 MG
400 TABLET ORAL EVERY 8 HOURS PRN
COMMUNITY

## 2024-01-01 RX ORDER — HYDROXYZINE HYDROCHLORIDE 25 MG/1
25 TABLET, FILM COATED ORAL EVERY 6 HOURS PRN
COMMUNITY

## 2024-01-01 RX ORDER — MIRTAZAPINE 15 MG/1
15 TABLET, FILM COATED ORAL NIGHTLY
Status: DISCONTINUED | OUTPATIENT
Start: 2024-01-01 | End: 2024-01-01

## 2024-01-01 RX ORDER — ACETAMINOPHEN 650 MG/1
650 SUPPOSITORY RECTAL EVERY 6 HOURS PRN
Status: DISCONTINUED | OUTPATIENT
Start: 2024-01-01 | End: 2024-01-01 | Stop reason: HOSPADM

## 2024-01-01 RX ORDER — LANOLIN ALCOHOL/MO/W.PET/CERES
3 CREAM (GRAM) TOPICAL NIGHTLY
Status: DISCONTINUED | OUTPATIENT
Start: 2024-01-01 | End: 2024-01-01 | Stop reason: HOSPADM

## 2024-01-01 RX ORDER — DONEPEZIL HYDROCHLORIDE 5 MG/1
5 TABLET, FILM COATED ORAL NIGHTLY
Status: DISCONTINUED | OUTPATIENT
Start: 2024-01-01 | End: 2024-01-01

## 2024-01-01 RX ORDER — ACETAMINOPHEN 325 MG/1
650 TABLET ORAL EVERY 6 HOURS PRN
Status: DISCONTINUED | OUTPATIENT
Start: 2024-01-01 | End: 2024-01-01 | Stop reason: HOSPADM

## 2024-01-01 RX ORDER — POLYETHYLENE GLYCOL 3350 17 G/17G
17 POWDER, FOR SOLUTION ORAL DAILY PRN
Status: DISCONTINUED | OUTPATIENT
Start: 2024-01-01 | End: 2024-01-01 | Stop reason: HOSPADM

## 2024-01-01 RX ORDER — MAGNESIUM SULFATE IN WATER 40 MG/ML
2000 INJECTION, SOLUTION INTRAVENOUS PRN
Status: DISCONTINUED | OUTPATIENT
Start: 2024-01-01 | End: 2024-01-01 | Stop reason: HOSPADM

## 2024-01-01 RX ORDER — CHOLECALCIFEROL (VITAMIN D3) 50 MCG
4000 TABLET ORAL DAILY
Status: DISCONTINUED | OUTPATIENT
Start: 2024-01-01 | End: 2024-01-01 | Stop reason: HOSPADM

## 2024-01-01 RX ADMIN — LITHIUM CARBONATE 300 MG: 300 CAPSULE ORAL at 16:20

## 2024-01-01 RX ADMIN — MEMANTINE 10 MG: 10 TABLET ORAL at 09:18

## 2024-01-01 RX ADMIN — Medication 3 MG: at 22:49

## 2024-01-01 RX ADMIN — SODIUM CHLORIDE, PRESERVATIVE FREE 10 ML: 5 INJECTION INTRAVENOUS at 09:25

## 2024-01-01 RX ADMIN — MEMANTINE 10 MG: 10 TABLET ORAL at 22:49

## 2024-01-01 RX ADMIN — Medication 3 MG: at 20:21

## 2024-01-01 RX ADMIN — LORAZEPAM 1 MG: 2 INJECTION INTRAMUSCULAR; INTRAVENOUS at 11:11

## 2024-01-01 RX ADMIN — LITHIUM CARBONATE 300 MG: 300 CAPSULE ORAL at 09:18

## 2024-01-01 RX ADMIN — LITHIUM CARBONATE 300 MG: 300 CAPSULE ORAL at 10:14

## 2024-01-01 RX ADMIN — WATER 1000 MG: 1 INJECTION INTRAMUSCULAR; INTRAVENOUS; SUBCUTANEOUS at 18:03

## 2024-01-01 RX ADMIN — SODIUM CHLORIDE, PRESERVATIVE FREE 10 ML: 5 INJECTION INTRAVENOUS at 08:38

## 2024-01-01 RX ADMIN — SODIUM CHLORIDE, PRESERVATIVE FREE 10 ML: 5 INJECTION INTRAVENOUS at 11:11

## 2024-01-01 RX ADMIN — MEMANTINE 10 MG: 10 TABLET ORAL at 23:12

## 2024-01-01 RX ADMIN — SODIUM CHLORIDE, PRESERVATIVE FREE 10 ML: 5 INJECTION INTRAVENOUS at 08:39

## 2024-01-01 RX ADMIN — LABETALOL HYDROCHLORIDE 10 MG: 5 INJECTION INTRAVENOUS at 02:07

## 2024-01-01 RX ADMIN — Medication 3 MG: at 23:12

## 2024-01-01 RX ADMIN — MEMANTINE 10 MG: 10 TABLET ORAL at 20:21

## 2024-01-01 RX ADMIN — LABETALOL HYDROCHLORIDE 10 MG: 5 INJECTION INTRAVENOUS at 20:51

## 2024-01-01 RX ADMIN — SODIUM CHLORIDE, PRESERVATIVE FREE 10 ML: 5 INJECTION INTRAVENOUS at 10:34

## 2024-01-01 RX ADMIN — SODIUM CHLORIDE: 9 INJECTION, SOLUTION INTRAVENOUS at 09:25

## 2024-01-01 RX ADMIN — Medication 4000 UNITS: at 09:18

## 2024-01-01 RX ADMIN — SODIUM CHLORIDE, PRESERVATIVE FREE 10 ML: 5 INJECTION INTRAVENOUS at 23:16

## 2024-01-01 RX ADMIN — ACETAMINOPHEN 650 MG: 325 TABLET ORAL at 16:20

## 2024-01-01 RX ADMIN — LITHIUM CARBONATE 300 MG: 300 CAPSULE ORAL at 18:04

## 2024-01-01 RX ADMIN — Medication 4000 UNITS: at 10:14

## 2024-01-01 RX ADMIN — MEMANTINE 10 MG: 10 TABLET ORAL at 18:04

## 2024-01-01 RX ADMIN — LITHIUM CARBONATE 300 MG: 300 CAPSULE ORAL at 08:38

## 2024-01-01 RX ADMIN — SODIUM CHLORIDE, PRESERVATIVE FREE 10 ML: 5 INJECTION INTRAVENOUS at 20:21

## 2024-01-01 RX ADMIN — SODIUM CHLORIDE, PRESERVATIVE FREE 10 ML: 5 INJECTION INTRAVENOUS at 10:14

## 2024-01-01 RX ADMIN — SODIUM CHLORIDE, PRESERVATIVE FREE 10 ML: 5 INJECTION INTRAVENOUS at 09:48

## 2024-01-01 RX ADMIN — MEMANTINE 10 MG: 10 TABLET ORAL at 01:11

## 2024-01-01 RX ADMIN — LORAZEPAM 1 MG: 2 INJECTION INTRAMUSCULAR; INTRAVENOUS at 12:12

## 2024-01-01 RX ADMIN — HALOPERIDOL LACTATE 1 MG: 5 INJECTION, SOLUTION INTRAMUSCULAR at 04:15

## 2024-01-01 RX ADMIN — Medication 4000 UNITS: at 18:16

## 2024-01-01 RX ADMIN — MEMANTINE 10 MG: 10 TABLET ORAL at 08:38

## 2024-01-01 RX ADMIN — SODIUM CHLORIDE, PRESERVATIVE FREE 10 ML: 5 INJECTION INTRAVENOUS at 22:49

## 2024-01-01 RX ADMIN — WATER 1000 MG: 1 INJECTION INTRAMUSCULAR; INTRAVENOUS; SUBCUTANEOUS at 17:48

## 2024-01-01 RX ADMIN — Medication 4000 UNITS: at 08:38

## 2024-01-01 RX ADMIN — MEMANTINE 10 MG: 10 TABLET ORAL at 10:14

## 2024-01-01 RX ADMIN — SODIUM CHLORIDE, PRESERVATIVE FREE 10 ML: 5 INJECTION INTRAVENOUS at 21:16

## 2024-01-01 ASSESSMENT — PAIN SCALES - WONG BAKER
WONGBAKER_NUMERICALRESPONSE: NO HURT

## 2024-01-01 ASSESSMENT — PAIN DESCRIPTION - PAIN TYPE: TYPE: ACUTE PAIN

## 2024-01-01 ASSESSMENT — PAIN DESCRIPTION - ONSET: ONSET: GRADUAL

## 2024-01-01 ASSESSMENT — PAIN DESCRIPTION - LOCATION: LOCATION: BACK;HEAD

## 2024-01-01 ASSESSMENT — PAIN DESCRIPTION - DESCRIPTORS: DESCRIPTORS: PATIENT UNABLE TO DESCRIBE

## 2024-01-01 ASSESSMENT — PAIN - FUNCTIONAL ASSESSMENT: PAIN_FUNCTIONAL_ASSESSMENT: ACTIVITIES ARE NOT PREVENTED

## 2024-01-01 ASSESSMENT — PAIN DESCRIPTION - ORIENTATION: ORIENTATION: LOWER

## 2024-01-01 ASSESSMENT — PAIN DESCRIPTION - FREQUENCY: FREQUENCY: INTERMITTENT

## 2024-01-01 ASSESSMENT — PAIN SCALES - GENERAL: PAINLEVEL_OUTOF10: 2

## 2024-01-09 ENCOUNTER — HOSPITAL ENCOUNTER (EMERGENCY)
Age: 79
Discharge: NURSING, REHAB OR LONG-TERM CARE | End: 2024-01-10
Attending: EMERGENCY MEDICINE
Payer: MEDICARE

## 2024-01-09 ENCOUNTER — APPOINTMENT (OUTPATIENT)
Dept: GENERAL RADIOLOGY | Age: 79
End: 2024-01-09
Payer: MEDICARE

## 2024-01-09 DIAGNOSIS — E86.0 DEHYDRATION: Primary | ICD-10-CM

## 2024-01-09 DIAGNOSIS — R91.1 LUNG NODULE: ICD-10-CM

## 2024-01-09 LAB
ALBUMIN SERPL-MCNC: 4.6 G/DL (ref 3.5–5.2)
ALP SERPL-CCNC: 56 U/L (ref 35–104)
ALT SERPL-CCNC: 10 U/L (ref 0–32)
ANION GAP SERPL CALCULATED.3IONS-SCNC: 14 MMOL/L (ref 7–16)
AST SERPL-CCNC: 15 U/L (ref 0–31)
B PARAP IS1001 DNA NPH QL NAA+NON-PROBE: NOT DETECTED
B PERT DNA SPEC QL NAA+PROBE: NOT DETECTED
BASOPHILS # BLD: 0.03 K/UL (ref 0–0.2)
BASOPHILS NFR BLD: 1 % (ref 0–2)
BILIRUB SERPL-MCNC: 0.8 MG/DL (ref 0–1.2)
BILIRUB UR QL STRIP: ABNORMAL
BUN SERPL-MCNC: 23 MG/DL (ref 6–23)
C PNEUM DNA NPH QL NAA+NON-PROBE: NOT DETECTED
CALCIUM SERPL-MCNC: 9.8 MG/DL (ref 8.6–10.2)
CHLORIDE SERPL-SCNC: 99 MMOL/L (ref 98–107)
CLARITY UR: CLEAR
CO2 SERPL-SCNC: 26 MMOL/L (ref 22–29)
COLOR UR: YELLOW
COMMENT: ABNORMAL
CREAT SERPL-MCNC: 0.7 MG/DL (ref 0.5–1)
EOSINOPHIL # BLD: 0.04 K/UL (ref 0.05–0.5)
EOSINOPHILS RELATIVE PERCENT: 1 % (ref 0–6)
ERYTHROCYTE [DISTWIDTH] IN BLOOD BY AUTOMATED COUNT: 12.9 % (ref 11.5–15)
FLUAV RNA NPH QL NAA+NON-PROBE: NOT DETECTED
FLUBV RNA NPH QL NAA+NON-PROBE: NOT DETECTED
GFR SERPL CREATININE-BSD FRML MDRD: >60 ML/MIN/1.73M2
GLUCOSE BLD-MCNC: 70 MG/DL (ref 74–99)
GLUCOSE SERPL-MCNC: 70 MG/DL (ref 74–99)
GLUCOSE UR STRIP-MCNC: NEGATIVE MG/DL
HADV DNA NPH QL NAA+NON-PROBE: NOT DETECTED
HCOV 229E RNA NPH QL NAA+NON-PROBE: NOT DETECTED
HCOV HKU1 RNA NPH QL NAA+NON-PROBE: NOT DETECTED
HCOV NL63 RNA NPH QL NAA+NON-PROBE: NOT DETECTED
HCOV OC43 RNA NPH QL NAA+NON-PROBE: NOT DETECTED
HCT VFR BLD AUTO: 39.6 % (ref 34–48)
HGB BLD-MCNC: 13.6 G/DL (ref 11.5–15.5)
HGB UR QL STRIP.AUTO: NEGATIVE
HMPV RNA NPH QL NAA+NON-PROBE: NOT DETECTED
HPIV1 RNA NPH QL NAA+NON-PROBE: NOT DETECTED
HPIV2 RNA NPH QL NAA+NON-PROBE: NOT DETECTED
HPIV3 RNA NPH QL NAA+NON-PROBE: NOT DETECTED
HPIV4 RNA NPH QL NAA+NON-PROBE: NOT DETECTED
IMM GRANULOCYTES # BLD AUTO: 0.03 K/UL (ref 0–0.58)
IMM GRANULOCYTES NFR BLD: 1 % (ref 0–5)
KETONES UR STRIP-MCNC: >80 MG/DL
LACTATE BLDV-SCNC: 0.9 MMOL/L (ref 0.5–2.2)
LEUKOCYTE ESTERASE UR QL STRIP: NEGATIVE
LYMPHOCYTES NFR BLD: 1.38 K/UL (ref 1.5–4)
LYMPHOCYTES RELATIVE PERCENT: 21 % (ref 20–42)
M PNEUMO DNA NPH QL NAA+NON-PROBE: NOT DETECTED
MCH RBC QN AUTO: 31.5 PG (ref 26–35)
MCHC RBC AUTO-ENTMCNC: 34.3 G/DL (ref 32–34.5)
MCV RBC AUTO: 91.7 FL (ref 80–99.9)
MONOCYTES NFR BLD: 0.38 K/UL (ref 0.1–0.95)
MONOCYTES NFR BLD: 6 % (ref 2–12)
NEUTROPHILS NFR BLD: 71 % (ref 43–80)
NEUTS SEG NFR BLD: 4.63 K/UL (ref 1.8–7.3)
NITRITE UR QL STRIP: NEGATIVE
PH UR STRIP: 6 [PH] (ref 5–9)
PLATELET # BLD AUTO: 198 K/UL (ref 130–450)
PMV BLD AUTO: 10.2 FL (ref 7–12)
POTASSIUM SERPL-SCNC: 4 MMOL/L (ref 3.5–5)
PROT SERPL-MCNC: 7.6 G/DL (ref 6.4–8.3)
PROT UR STRIP-MCNC: NEGATIVE MG/DL
RBC # BLD AUTO: 4.32 M/UL (ref 3.5–5.5)
RSV RNA NPH QL NAA+NON-PROBE: NOT DETECTED
RV+EV RNA NPH QL NAA+NON-PROBE: NOT DETECTED
SARS-COV-2 RNA NPH QL NAA+NON-PROBE: NOT DETECTED
SODIUM SERPL-SCNC: 139 MMOL/L (ref 132–146)
SP GR UR STRIP: >1.03 (ref 1–1.03)
SPECIMEN DESCRIPTION: NORMAL
TROPONIN I SERPL HS-MCNC: 17 NG/L (ref 0–9)
UROBILINOGEN UR STRIP-ACNC: 1 EU/DL (ref 0–1)
WBC OTHER # BLD: 6.5 K/UL (ref 4.5–11.5)

## 2024-01-09 PROCEDURE — 2580000003 HC RX 258: Performed by: EMERGENCY MEDICINE

## 2024-01-09 PROCEDURE — 96361 HYDRATE IV INFUSION ADD-ON: CPT

## 2024-01-09 PROCEDURE — 85025 COMPLETE CBC W/AUTO DIFF WBC: CPT

## 2024-01-09 PROCEDURE — 84484 ASSAY OF TROPONIN QUANT: CPT

## 2024-01-09 PROCEDURE — 82962 GLUCOSE BLOOD TEST: CPT

## 2024-01-09 PROCEDURE — 83605 ASSAY OF LACTIC ACID: CPT

## 2024-01-09 PROCEDURE — 96360 HYDRATION IV INFUSION INIT: CPT

## 2024-01-09 PROCEDURE — 0202U NFCT DS 22 TRGT SARS-COV-2: CPT

## 2024-01-09 PROCEDURE — 71045 X-RAY EXAM CHEST 1 VIEW: CPT

## 2024-01-09 PROCEDURE — 93005 ELECTROCARDIOGRAM TRACING: CPT | Performed by: EMERGENCY MEDICINE

## 2024-01-09 PROCEDURE — 80053 COMPREHEN METABOLIC PANEL: CPT

## 2024-01-09 PROCEDURE — 99285 EMERGENCY DEPT VISIT HI MDM: CPT

## 2024-01-09 PROCEDURE — 81003 URINALYSIS AUTO W/O SCOPE: CPT

## 2024-01-09 RX ORDER — 0.9 % SODIUM CHLORIDE 0.9 %
1000 INTRAVENOUS SOLUTION INTRAVENOUS ONCE
Status: COMPLETED | OUTPATIENT
Start: 2024-01-09 | End: 2024-01-09

## 2024-01-09 RX ORDER — SODIUM CHLORIDE 9 MG/ML
INJECTION, SOLUTION INTRAVENOUS CONTINUOUS
Status: DISCONTINUED | OUTPATIENT
Start: 2024-01-09 | End: 2024-01-10 | Stop reason: HOSPADM

## 2024-01-09 RX ORDER — 0.9 % SODIUM CHLORIDE 0.9 %
1000 INTRAVENOUS SOLUTION INTRAVENOUS ONCE
Status: COMPLETED | OUTPATIENT
Start: 2024-01-09 | End: 2024-01-10

## 2024-01-09 RX ADMIN — SODIUM CHLORIDE: 9 INJECTION, SOLUTION INTRAVENOUS at 20:04

## 2024-01-09 RX ADMIN — SODIUM CHLORIDE 1000 ML: 9 INJECTION, SOLUTION INTRAVENOUS at 21:44

## 2024-01-09 RX ADMIN — SODIUM CHLORIDE 1000 ML: 9 INJECTION, SOLUTION INTRAVENOUS at 16:00

## 2024-01-10 VITALS
HEART RATE: 66 BPM | DIASTOLIC BLOOD PRESSURE: 84 MMHG | RESPIRATION RATE: 16 BRPM | SYSTOLIC BLOOD PRESSURE: 132 MMHG | TEMPERATURE: 97.7 F | OXYGEN SATURATION: 96 %

## 2024-01-10 NOTE — ED PROVIDER NOTES
Department of Emergency Medicine   ED  Provider Note  Admit Date/RoomTime: 1/9/2024  3:49 PM  ED Room: [facility]          History of Present Illness:  1/9/24, Time: 10:54 PM EST  Chief Complaint   Patient presents with    Failure To Thrive     Pt sent in from NH due to not eating or drinking for 3 days.                 Babs Lewis is a 78 y.o. female presenting to the ED for possible dehydration.  Patient presents with a nursing home.  She is not been eating and drinking very well from the nursing home.  She has no symptoms or complaints.  No fevers or chills.  No nausea or vomiting.  No change in bowel or bladder.  No cough or sputum.  No paresthesias.  No chest pain or shortness of breath.  No other symptoms or complaints.    Review of Systems:   Pertinent positives and negatives are stated within HPI, all other systems reviewed and are negative.        --------------------------------------------- PAST HISTORY ---------------------------------------------  Past Medical History:  has a past medical history of Mastitis in female.    Past Surgical History:  has a past surgical history that includes Breast lumpectomy (1993).    Social History:  reports that she has never smoked. She does not have any smokeless tobacco history on file. She reports current alcohol use of about 2.0 standard drinks of alcohol per week. She reports that she does not use drugs.    Family History: family history includes Cancer (age of onset: 60) in her mother; Cancer (age of onset: 75) in her father; Heart Disease in her brother; High Blood Pressure in her mother.. Unless otherwise noted, family history is non contributory    The patient’s home medications have been reviewed.    Allergies: Patient has no known allergies.        ---------------------------------------------------PHYSICAL EXAM--------------------------------------    Constitutional/General: Alert and oriented x3  Head: Normocephalic and atraumatic  Eyes: PERRL, EOMI,  dysrhythmia.   CPT 56432         Medical Decision Making:     Patient presents for possible dehydration.  Concern for BETHANY, hypoglycemia, anemia, or other pathologies.  She had no symptoms or complaints on arrival.  She was afebrile and hemodynamically stable.  Did receive IV fluids.    Labs are by me shows a normal CBC and CMP, respiratory panel is negative, troponin 17, lactic 0.9, urinalysis consistent ketones, blood sugar is 70    Chest x-ray inter myself shows no acute findings, radiology agrees possible lung nodule noted    EKG inter by me shows sinus rhythm, rate 64, no STEMI, no ischemic change    Chart reviewed, patient was seen by neurology on 1/3/2024 by the [facility] for microvascular disease    On reevaluation, patient's resting comfortably.  No symptoms or complaints.  He ate a meal tray without problem.  Remains medically stable, afebrile, overall well-appearing.  Sister bedside, updated on the findings.  Given her negative workup, and the above findings, discharge is reasonable.  Patient be discharged, she is to follow-up with her PCP regarding the lung nodule, she is educated on signs and symptoms require emergent evaluation.  Patient's daughter was updated on the plan via nursing.      Counseling:   The emergency provider has spoken with the patient and discussed today’s results, in addition to providing specific details for the plan of care and counseling regarding the diagnosis and prognosis.  Questions are answered at this time and they are agreeable with the plan.       --------------------------------- IMPRESSION AND DISPOSITION ---------------------------------    IMPRESSION  1. Dehydration        DISPOSITION  Disposition: Discharge to home  Patient condition is stable        NOTE: This report was transcribed using voice recognition software. Every effort was made to ensure accuracy; however, inadvertent computerized transcription errors may be present        Veena Dia,

## 2024-01-12 ENCOUNTER — APPOINTMENT (OUTPATIENT)
Dept: CT IMAGING | Age: 79
End: 2024-01-12
Payer: MEDICARE

## 2024-01-12 ENCOUNTER — APPOINTMENT (OUTPATIENT)
Dept: GENERAL RADIOLOGY | Age: 79
End: 2024-01-12
Payer: MEDICARE

## 2024-01-12 ENCOUNTER — HOSPITAL ENCOUNTER (EMERGENCY)
Age: 79
Discharge: HOME | End: 2024-01-13
Attending: STUDENT IN AN ORGANIZED HEALTH CARE EDUCATION/TRAINING PROGRAM
Payer: MEDICARE

## 2024-01-12 VITALS
OXYGEN SATURATION: 99 % | TEMPERATURE: 98.2 F | DIASTOLIC BLOOD PRESSURE: 65 MMHG | BODY MASS INDEX: 21.26 KG/M2 | RESPIRATION RATE: 14 BRPM | HEIGHT: 63 IN | WEIGHT: 120 LBS | HEART RATE: 50 BPM | SYSTOLIC BLOOD PRESSURE: 135 MMHG

## 2024-01-12 DIAGNOSIS — N30.00 ACUTE CYSTITIS WITHOUT HEMATURIA: Primary | ICD-10-CM

## 2024-01-12 DIAGNOSIS — S09.90XA CLOSED HEAD INJURY, INITIAL ENCOUNTER: ICD-10-CM

## 2024-01-12 LAB
ALBUMIN SERPL-MCNC: 4.6 G/DL (ref 3.5–5.2)
ALP SERPL-CCNC: 64 U/L (ref 35–104)
ALT SERPL-CCNC: 13 U/L (ref 0–32)
AMMONIA PLAS-SCNC: 14 UMOL/L (ref 11–51)
ANION GAP SERPL CALCULATED.3IONS-SCNC: 12 MMOL/L (ref 7–16)
AST SERPL-CCNC: 19 U/L (ref 0–31)
BACTERIA URNS QL MICRO: ABNORMAL
BASOPHILS # BLD: 0.02 K/UL (ref 0–0.2)
BASOPHILS NFR BLD: 0 % (ref 0–2)
BILIRUB SERPL-MCNC: 0.7 MG/DL (ref 0–1.2)
BILIRUB UR QL STRIP: NEGATIVE
BUN SERPL-MCNC: 10 MG/DL (ref 6–23)
CALCIUM SERPL-MCNC: 9.2 MG/DL (ref 8.6–10.2)
CHLORIDE SERPL-SCNC: 99 MMOL/L (ref 98–107)
CK SERPL-CCNC: 131 U/L (ref 20–180)
CLARITY UR: ABNORMAL
CO2 SERPL-SCNC: 25 MMOL/L (ref 22–29)
COLOR UR: YELLOW
CREAT SERPL-MCNC: 0.5 MG/DL (ref 0.5–1)
EKG ATRIAL RATE: 64 BPM
EKG ATRIAL RATE: 68 BPM
EKG P AXIS: 61 DEGREES
EKG P AXIS: 63 DEGREES
EKG P-R INTERVAL: 130 MS
EKG P-R INTERVAL: 134 MS
EKG Q-T INTERVAL: 394 MS
EKG Q-T INTERVAL: 414 MS
EKG QRS DURATION: 68 MS
EKG QRS DURATION: 76 MS
EKG QTC CALCULATION (BAZETT): 406 MS
EKG QTC CALCULATION (BAZETT): 440 MS
EKG R AXIS: 51 DEGREES
EKG R AXIS: 54 DEGREES
EKG T AXIS: 47 DEGREES
EKG T AXIS: 55 DEGREES
EKG VENTRICULAR RATE: 64 BPM
EKG VENTRICULAR RATE: 68 BPM
EOSINOPHIL # BLD: 0.04 K/UL (ref 0.05–0.5)
EOSINOPHILS RELATIVE PERCENT: 0 % (ref 0–6)
ERYTHROCYTE [DISTWIDTH] IN BLOOD BY AUTOMATED COUNT: 13.2 % (ref 11.5–15)
GFR SERPL CREATININE-BSD FRML MDRD: >60 ML/MIN/1.73M2
GLUCOSE SERPL-MCNC: 123 MG/DL (ref 74–99)
GLUCOSE UR STRIP-MCNC: 100 MG/DL
HCT VFR BLD AUTO: 38.9 % (ref 34–48)
HGB BLD-MCNC: 13.5 G/DL (ref 11.5–15.5)
HGB UR QL STRIP.AUTO: ABNORMAL
IMM GRANULOCYTES # BLD AUTO: 0.06 K/UL (ref 0–0.58)
IMM GRANULOCYTES NFR BLD: 1 % (ref 0–5)
INFLUENZA A BY PCR: NOT DETECTED
INFLUENZA B BY PCR: NOT DETECTED
KETONES UR STRIP-MCNC: 15 MG/DL
LACTATE BLDV-SCNC: 1.9 MMOL/L (ref 0.5–2.2)
LEUKOCYTE ESTERASE UR QL STRIP: ABNORMAL
LYMPHOCYTES NFR BLD: 0.59 K/UL (ref 1.5–4)
LYMPHOCYTES RELATIVE PERCENT: 5 % (ref 20–42)
MAGNESIUM SERPL-MCNC: 2.1 MG/DL (ref 1.6–2.6)
MCH RBC QN AUTO: 31.5 PG (ref 26–35)
MCHC RBC AUTO-ENTMCNC: 34.7 G/DL (ref 32–34.5)
MCV RBC AUTO: 90.9 FL (ref 80–99.9)
MONOCYTES NFR BLD: 0.39 K/UL (ref 0.1–0.95)
MONOCYTES NFR BLD: 3 % (ref 2–12)
NEUTROPHILS NFR BLD: 91 % (ref 43–80)
NEUTS SEG NFR BLD: 11 K/UL (ref 1.8–7.3)
NITRITE UR QL STRIP: NEGATIVE
PH UR STRIP: 7.5 [PH] (ref 5–9)
PLATELET # BLD AUTO: 199 K/UL (ref 130–450)
PMV BLD AUTO: 10.5 FL (ref 7–12)
POTASSIUM SERPL-SCNC: 3.9 MMOL/L (ref 3.5–5)
PROT SERPL-MCNC: 7.8 G/DL (ref 6.4–8.3)
PROT UR STRIP-MCNC: NEGATIVE MG/DL
RBC # BLD AUTO: 4.28 M/UL (ref 3.5–5.5)
RBC # BLD: NORMAL 10*6/UL
RBC #/AREA URNS HPF: ABNORMAL /HPF
RSV BY PCR: NOT DETECTED
SARS-COV-2 RDRP RESP QL NAA+PROBE: NOT DETECTED
SODIUM SERPL-SCNC: 136 MMOL/L (ref 132–146)
SP GR UR STRIP: 1.02 (ref 1–1.03)
SPECIMEN DESCRIPTION: NORMAL
SPECIMEN SOURCE: NORMAL
TROPONIN I SERPL HS-MCNC: 15 NG/L (ref 0–9)
TROPONIN I SERPL HS-MCNC: 16 NG/L (ref 0–9)
UROBILINOGEN UR STRIP-ACNC: 0.2 EU/DL (ref 0–1)
WBC #/AREA URNS HPF: ABNORMAL /HPF
WBC OTHER # BLD: 12.1 K/UL (ref 4.5–11.5)

## 2024-01-12 PROCEDURE — 83605 ASSAY OF LACTIC ACID: CPT

## 2024-01-12 PROCEDURE — 85025 COMPLETE CBC W/AUTO DIFF WBC: CPT

## 2024-01-12 PROCEDURE — 6360000002 HC RX W HCPCS

## 2024-01-12 PROCEDURE — 6360000002 HC RX W HCPCS: Performed by: STUDENT IN AN ORGANIZED HEALTH CARE EDUCATION/TRAINING PROGRAM

## 2024-01-12 PROCEDURE — 2580000003 HC RX 258: Performed by: STUDENT IN AN ORGANIZED HEALTH CARE EDUCATION/TRAINING PROGRAM

## 2024-01-12 PROCEDURE — 72125 CT NECK SPINE W/O DYE: CPT

## 2024-01-12 PROCEDURE — 70450 CT HEAD/BRAIN W/O DYE: CPT

## 2024-01-12 PROCEDURE — 93005 ELECTROCARDIOGRAM TRACING: CPT

## 2024-01-12 PROCEDURE — 82550 ASSAY OF CK (CPK): CPT

## 2024-01-12 PROCEDURE — 87634 RSV DNA/RNA AMP PROBE: CPT

## 2024-01-12 PROCEDURE — 2580000003 HC RX 258

## 2024-01-12 PROCEDURE — 80053 COMPREHEN METABOLIC PANEL: CPT

## 2024-01-12 PROCEDURE — 82140 ASSAY OF AMMONIA: CPT

## 2024-01-12 PROCEDURE — 87635 SARS-COV-2 COVID-19 AMP PRB: CPT

## 2024-01-12 PROCEDURE — 87086 URINE CULTURE/COLONY COUNT: CPT

## 2024-01-12 PROCEDURE — 87077 CULTURE AEROBIC IDENTIFY: CPT

## 2024-01-12 PROCEDURE — 71045 X-RAY EXAM CHEST 1 VIEW: CPT

## 2024-01-12 PROCEDURE — 84484 ASSAY OF TROPONIN QUANT: CPT

## 2024-01-12 PROCEDURE — 87502 INFLUENZA DNA AMP PROBE: CPT

## 2024-01-12 PROCEDURE — 93010 ELECTROCARDIOGRAM REPORT: CPT | Performed by: INTERNAL MEDICINE

## 2024-01-12 PROCEDURE — 96375 TX/PRO/DX INJ NEW DRUG ADDON: CPT

## 2024-01-12 PROCEDURE — 99285 EMERGENCY DEPT VISIT HI MDM: CPT

## 2024-01-12 PROCEDURE — 96361 HYDRATE IV INFUSION ADD-ON: CPT

## 2024-01-12 PROCEDURE — 96374 THER/PROPH/DIAG INJ IV PUSH: CPT

## 2024-01-12 PROCEDURE — 83735 ASSAY OF MAGNESIUM: CPT

## 2024-01-12 PROCEDURE — 81001 URINALYSIS AUTO W/SCOPE: CPT

## 2024-01-12 RX ORDER — 0.9 % SODIUM CHLORIDE 0.9 %
1000 INTRAVENOUS SOLUTION INTRAVENOUS ONCE
Status: COMPLETED | OUTPATIENT
Start: 2024-01-12 | End: 2024-01-12

## 2024-01-12 RX ORDER — CEFDINIR 300 MG/1
300 CAPSULE ORAL 2 TIMES DAILY
Qty: 20 CAPSULE | Refills: 0 | Status: ON HOLD | OUTPATIENT
Start: 2024-01-12 | End: 2024-01-20

## 2024-01-12 RX ORDER — ONDANSETRON 2 MG/ML
4 INJECTION INTRAMUSCULAR; INTRAVENOUS ONCE
Status: COMPLETED | OUTPATIENT
Start: 2024-01-12 | End: 2024-01-12

## 2024-01-12 RX ADMIN — WATER 2000 MG: 1 INJECTION INTRAMUSCULAR; INTRAVENOUS; SUBCUTANEOUS at 19:23

## 2024-01-12 RX ADMIN — SODIUM CHLORIDE 1000 ML: 9 INJECTION, SOLUTION INTRAVENOUS at 16:28

## 2024-01-12 RX ADMIN — ONDANSETRON 4 MG: 2 INJECTION INTRAMUSCULAR; INTRAVENOUS at 16:34

## 2024-01-12 ASSESSMENT — PAIN - FUNCTIONAL ASSESSMENT
PAIN_FUNCTIONAL_ASSESSMENT: NONE - DENIES PAIN

## 2024-01-12 NOTE — ED NOTES
Incontinence care provided. Full linen and gown change. Patient placed on external catheter. Patient family friend at bedside..

## 2024-01-12 NOTE — ED PROVIDER NOTES
[facility] EMERGENCY DEPARTMENT  EMERGENCY DEPARTMENT ENCOUNTER        Pt Name: Babs Lewis  MRN: [id number]  Birthdate 1945  Date of evaluation: 1/12/2024  Provider: Yuri Merritt MD  PCP: Jesus Mckeon DO  Note Started: 4:00 PM EST 1/12/24    CHIEF COMPLAINT       Chief Complaint   Patient presents with    Altered Mental Status     Increase in confusion per facility. Possible new onset dementia, was just seen at [facility].     Fall     Was found on ground at facility with vomit on her this morning. No complaints of pain at this time.        HISTORY OF PRESENT ILLNESS: 1 or more Elements   History From: Patient.    Limitations to history : None    Babs Lewis is a 78 y.o. female who presents to the ED with complaints of altered mental status and a fall.  Patient is from a skilled nursing facility.  Patient was recently diagnosed with major neurocognitive disorder, she follows up with neurologist at [facility].  She was recently seen by her  neurologist 1 week ago.  According to the nursing home, patient had an unwitnessed fall this morning, she was found to be lying on the floor with vomit on her chest.  Patient is not sure whether she did hit her head.  Patient complains of mild left-sided headache after the fall.  She also complains of dizziness which could be related to due to her dehydration.  She denies any chest pain or shortness of breath she denies any abdominal pain.  She complains of nausea now.  She denies any other active complaints now.      Nursing Notes were all reviewed and agreed with or any disagreements were addressed in the HPI.    ROS:   Pertinent positives and negatives are stated within HPI, all other systems reviewed and are negative.    --------------------------------------------- PAST HISTORY ---------------------------------------------  Past Medical History:  has a past medical history of Mastitis in female.    Past Surgical  discharge the patient has been made with shared decision making.      Vital signs upon arrival /65   Pulse 50   Temp 98.2 °F (36.8 °C) (Axillary)   Resp 14   Ht 1.6 m (5' 3\")   Wt 54.4 kg (120 lb)   SpO2 99%   BMI 21.26 kg/m²       Medications this visit include:   Orders Placed This Encounter   Medications    sodium chloride 0.9 % bolus 1,000 mL    ondansetron (ZOFRAN) injection 4 mg    cefTRIAXone (ROCEPHIN) 2,000 mg in sterile water 20 mL IV syringe     Order Specific Question:   Antimicrobial Indications     Answer:   Urinary Tract Infection    cefdinir (OMNICEF) 300 MG capsule     Sig: Take 1 capsule by mouth 2 times daily for 10 days     Dispense:  20 capsule     Refill:  0       Is this patient to be included in the SEP-1 core measure? No Exclusion criteria - the patient is NOT to be included for SEP-1 Core Measure due to: 2+ SIRS criteria are not met    Non-plain film images such as CT, Ultrasound and MRI are read by the radiologist. Plain radiographic images are visualized and preliminarily interpreted by the ED Provider with the below findings:      Discussion with Other Professionals: See ED course  CONSULTS: discussion with bolded \"IP consult\", otherwise consult was likely placed by admitting service  None    Social Determinants : None    Records Reviewed : Inpatient Notes      Chronic conditions:     CONSULTS: None.      Disposition:   Evaluated by EM team and discharge recommended.      Re-Evaluations/Consultations:             ED Course as of 01/13/24 0104   Fri Jan 12, 2024   1642 EKG:  This EKG is signed and interpreted by me.    Rate: 68  Rhythm: Sinus  Interpretation: Normal sinus rhythm, normal axis, artifact in inferior leads, nonspecific changes throughout, qtc is 440, LVH  Comparison: no previous EKG available    [KG]      ED Course User Index  [KG] Radha Wick, DO         This patient's ED course included: History, physical examination, reevaluation prior to

## 2024-01-13 NOTE — ED NOTES
Patient sleeping in in bed, respirations equal and unlabored. Lights dimmed for patient. Awaiting transport.

## 2024-01-13 NOTE — ED NOTES
Please call Mary Anne when patient is picked up, phone number is listed and up to date in the chart.

## 2024-01-13 NOTE — ED NOTES
Called [facility] to give nurse to nurse report. Was told by staff that there were no nurses in the building to give report to. Asked if there was a better time to call back for report. [facility] Notified staff that estimated PAS  time was around 0000 and patient will be sent with discharge paperwork.

## 2024-01-14 LAB
MICROORGANISM SPEC CULT: ABNORMAL
SPECIMEN DESCRIPTION: ABNORMAL

## 2024-01-15 LAB
EKG ATRIAL RATE: 68 BPM
EKG P AXIS: 63 DEGREES
EKG P-R INTERVAL: 130 MS
EKG Q-T INTERVAL: 414 MS
EKG QRS DURATION: 68 MS
EKG QTC CALCULATION (BAZETT): 440 MS
EKG R AXIS: 51 DEGREES
EKG T AXIS: 55 DEGREES
EKG VENTRICULAR RATE: 68 BPM
MICROORGANISM SPEC CULT: ABNORMAL
MICROORGANISM SPEC CULT: ABNORMAL
SPECIMEN DESCRIPTION: ABNORMAL

## 2024-01-15 PROCEDURE — 93010 ELECTROCARDIOGRAM REPORT: CPT | Performed by: INTERNAL MEDICINE

## 2024-01-20 ENCOUNTER — HOSPITAL ENCOUNTER (INPATIENT)
Age: 79
LOS: 9 days | Discharge: HOME HEALTH | DRG: 682 | End: 2024-01-29
Attending: STUDENT IN AN ORGANIZED HEALTH CARE EDUCATION/TRAINING PROGRAM | Admitting: GENERAL PRACTICE
Payer: MEDICARE

## 2024-01-20 ENCOUNTER — APPOINTMENT (OUTPATIENT)
Dept: GENERAL RADIOLOGY | Age: 79
DRG: 682 | End: 2024-01-20
Payer: MEDICARE

## 2024-01-20 ENCOUNTER — APPOINTMENT (OUTPATIENT)
Dept: CT IMAGING | Age: 79
DRG: 682 | End: 2024-01-20
Payer: MEDICARE

## 2024-01-20 DIAGNOSIS — N17.9 ACUTE KIDNEY INJURY (HCC): Primary | ICD-10-CM

## 2024-01-20 DIAGNOSIS — N17.9 ACUTE RENAL FAILURE, UNSPECIFIED ACUTE RENAL FAILURE TYPE (HCC): ICD-10-CM

## 2024-01-20 DIAGNOSIS — R53.1 GENERAL WEAKNESS: ICD-10-CM

## 2024-01-20 DIAGNOSIS — N39.0 URINARY TRACT INFECTION WITHOUT HEMATURIA, SITE UNSPECIFIED: ICD-10-CM

## 2024-01-20 LAB
ANION GAP SERPL CALCULATED.3IONS-SCNC: 11 MMOL/L (ref 7–16)
BACTERIA URNS QL MICRO: ABNORMAL
BASOPHILS # BLD: 0.11 K/UL (ref 0–0.2)
BASOPHILS NFR BLD: 1 % (ref 0–2)
BILIRUB UR QL STRIP: NEGATIVE
BUN SERPL-MCNC: 22 MG/DL (ref 6–23)
CALCIUM SERPL-MCNC: 8.9 MG/DL (ref 8.6–10.2)
CHLORIDE SERPL-SCNC: 100 MMOL/L (ref 98–107)
CLARITY UR: CLEAR
CO2 SERPL-SCNC: 24 MMOL/L (ref 22–29)
COLOR UR: YELLOW
CREAT SERPL-MCNC: 1.4 MG/DL (ref 0.5–1)
EOSINOPHIL # BLD: 0 K/UL (ref 0.05–0.5)
EOSINOPHILS RELATIVE PERCENT: 0 % (ref 0–6)
ERYTHROCYTE [DISTWIDTH] IN BLOOD BY AUTOMATED COUNT: 14.1 % (ref 11.5–15)
GFR SERPL CREATININE-BSD FRML MDRD: 39 ML/MIN/1.73M2
GLUCOSE SERPL-MCNC: 113 MG/DL (ref 74–99)
GLUCOSE UR STRIP-MCNC: NEGATIVE MG/DL
HCT VFR BLD AUTO: 38.3 % (ref 34–48)
HGB BLD-MCNC: 12.8 G/DL (ref 11.5–15.5)
HGB UR QL STRIP.AUTO: NEGATIVE
INFLUENZA A BY PCR: NOT DETECTED
INFLUENZA B BY PCR: NOT DETECTED
KETONES UR STRIP-MCNC: ABNORMAL MG/DL
LEUKOCYTE ESTERASE UR QL STRIP: ABNORMAL
LYMPHOCYTES NFR BLD: 0.11 K/UL (ref 1.5–4)
LYMPHOCYTES RELATIVE PERCENT: 1 % (ref 20–42)
MCH RBC QN AUTO: 31.7 PG (ref 26–35)
MCHC RBC AUTO-ENTMCNC: 33.4 G/DL (ref 32–34.5)
MCV RBC AUTO: 94.8 FL (ref 80–99.9)
MONOCYTES NFR BLD: 0.33 K/UL (ref 0.1–0.95)
MONOCYTES NFR BLD: 3 % (ref 2–12)
NEUTROPHILS NFR BLD: 96 % (ref 43–80)
NEUTS SEG NFR BLD: 12.25 K/UL (ref 1.8–7.3)
NITRITE UR QL STRIP: NEGATIVE
PH UR STRIP: 6 [PH] (ref 5–9)
PLATELET # BLD AUTO: 159 K/UL (ref 130–450)
PMV BLD AUTO: 9.5 FL (ref 7–12)
POTASSIUM SERPL-SCNC: 3.7 MMOL/L (ref 3.5–5)
PROT UR STRIP-MCNC: NEGATIVE MG/DL
RBC # BLD AUTO: 4.04 M/UL (ref 3.5–5.5)
RBC # BLD: NORMAL 10*6/UL
RBC #/AREA URNS HPF: ABNORMAL /HPF
SARS-COV-2 RDRP RESP QL NAA+PROBE: NOT DETECTED
SODIUM SERPL-SCNC: 135 MMOL/L (ref 132–146)
SP GR UR STRIP: 1.02 (ref 1–1.03)
SPECIMEN DESCRIPTION: NORMAL
TROPONIN I SERPL HS-MCNC: 23 NG/L (ref 0–9)
TROPONIN I SERPL HS-MCNC: 25 NG/L (ref 0–9)
UROBILINOGEN UR STRIP-ACNC: 0.2 EU/DL (ref 0–1)
WBC #/AREA URNS HPF: ABNORMAL /HPF
WBC OTHER # BLD: 12.8 K/UL (ref 4.5–11.5)

## 2024-01-20 PROCEDURE — 96374 THER/PROPH/DIAG INJ IV PUSH: CPT

## 2024-01-20 PROCEDURE — 6370000000 HC RX 637 (ALT 250 FOR IP): Performed by: GENERAL PRACTICE

## 2024-01-20 PROCEDURE — 99285 EMERGENCY DEPT VISIT HI MDM: CPT

## 2024-01-20 PROCEDURE — 70450 CT HEAD/BRAIN W/O DYE: CPT

## 2024-01-20 PROCEDURE — 85025 COMPLETE CBC W/AUTO DIFF WBC: CPT

## 2024-01-20 PROCEDURE — 84484 ASSAY OF TROPONIN QUANT: CPT

## 2024-01-20 PROCEDURE — 2580000003 HC RX 258

## 2024-01-20 PROCEDURE — 87086 URINE CULTURE/COLONY COUNT: CPT

## 2024-01-20 PROCEDURE — 1200000000 HC SEMI PRIVATE

## 2024-01-20 PROCEDURE — 93005 ELECTROCARDIOGRAM TRACING: CPT

## 2024-01-20 PROCEDURE — 72125 CT NECK SPINE W/O DYE: CPT

## 2024-01-20 PROCEDURE — 87502 INFLUENZA DNA AMP PROBE: CPT

## 2024-01-20 PROCEDURE — 2580000003 HC RX 258: Performed by: GENERAL PRACTICE

## 2024-01-20 PROCEDURE — 87635 SARS-COV-2 COVID-19 AMP PRB: CPT

## 2024-01-20 PROCEDURE — 6370000000 HC RX 637 (ALT 250 FOR IP): Performed by: STUDENT IN AN ORGANIZED HEALTH CARE EDUCATION/TRAINING PROGRAM

## 2024-01-20 PROCEDURE — 80048 BASIC METABOLIC PNL TOTAL CA: CPT

## 2024-01-20 PROCEDURE — 6360000002 HC RX W HCPCS

## 2024-01-20 PROCEDURE — 71045 X-RAY EXAM CHEST 1 VIEW: CPT

## 2024-01-20 PROCEDURE — 87040 BLOOD CULTURE FOR BACTERIA: CPT

## 2024-01-20 PROCEDURE — 81001 URINALYSIS AUTO W/SCOPE: CPT

## 2024-01-20 RX ORDER — ONDANSETRON 2 MG/ML
4 INJECTION INTRAMUSCULAR; INTRAVENOUS ONCE
Status: COMPLETED | OUTPATIENT
Start: 2024-01-20 | End: 2024-01-20

## 2024-01-20 RX ORDER — ACETAMINOPHEN 325 MG/1
650 TABLET ORAL EVERY 4 HOURS PRN
Status: DISCONTINUED | OUTPATIENT
Start: 2024-01-20 | End: 2024-01-29 | Stop reason: HOSPADM

## 2024-01-20 RX ORDER — CITALOPRAM 20 MG/1
20 TABLET ORAL DAILY
Status: DISCONTINUED | OUTPATIENT
Start: 2024-01-20 | End: 2024-01-29 | Stop reason: HOSPADM

## 2024-01-20 RX ORDER — ONDANSETRON 2 MG/ML
4 INJECTION INTRAMUSCULAR; INTRAVENOUS EVERY 6 HOURS PRN
Status: DISCONTINUED | OUTPATIENT
Start: 2024-01-20 | End: 2024-01-29 | Stop reason: HOSPADM

## 2024-01-20 RX ORDER — ACETAMINOPHEN 325 MG/1
650 TABLET ORAL ONCE
Status: COMPLETED | OUTPATIENT
Start: 2024-01-20 | End: 2024-01-20

## 2024-01-20 RX ORDER — SODIUM CHLORIDE 9 MG/ML
INJECTION, SOLUTION INTRAVENOUS CONTINUOUS
Status: DISCONTINUED | OUTPATIENT
Start: 2024-01-20 | End: 2024-01-20

## 2024-01-20 RX ORDER — SODIUM CHLORIDE 9 MG/ML
INJECTION, SOLUTION INTRAVENOUS CONTINUOUS
Status: DISCONTINUED | OUTPATIENT
Start: 2024-01-20 | End: 2024-01-25

## 2024-01-20 RX ORDER — 0.9 % SODIUM CHLORIDE 0.9 %
1000 INTRAVENOUS SOLUTION INTRAVENOUS ONCE
Status: COMPLETED | OUTPATIENT
Start: 2024-01-20 | End: 2024-01-20

## 2024-01-20 RX ORDER — CITALOPRAM 20 MG/1
20 TABLET ORAL DAILY
COMMUNITY

## 2024-01-20 RX ADMIN — ACETAMINOPHEN 650 MG: 325 TABLET ORAL at 22:31

## 2024-01-20 RX ADMIN — ONDANSETRON 4 MG: 2 INJECTION INTRAMUSCULAR; INTRAVENOUS at 15:13

## 2024-01-20 RX ADMIN — SODIUM CHLORIDE 1000 ML: 9 INJECTION, SOLUTION INTRAVENOUS at 16:33

## 2024-01-20 RX ADMIN — ACETAMINOPHEN 650 MG: 325 TABLET ORAL at 15:13

## 2024-01-20 RX ADMIN — WATER 1000 MG: 1 INJECTION INTRAMUSCULAR; INTRAVENOUS; SUBCUTANEOUS at 18:04

## 2024-01-20 RX ADMIN — SODIUM CHLORIDE: 9 INJECTION, SOLUTION INTRAVENOUS at 22:09

## 2024-01-20 RX ADMIN — SODIUM CHLORIDE 1000 ML: 9 INJECTION, SOLUTION INTRAVENOUS at 15:02

## 2024-01-20 RX ADMIN — SODIUM CHLORIDE: 9 INJECTION, SOLUTION INTRAVENOUS at 18:39

## 2024-01-20 ASSESSMENT — LIFESTYLE VARIABLES
HOW OFTEN DO YOU HAVE A DRINK CONTAINING ALCOHOL: NEVER
HOW MANY STANDARD DRINKS CONTAINING ALCOHOL DO YOU HAVE ON A TYPICAL DAY: PATIENT DOES NOT DRINK

## 2024-01-20 ASSESSMENT — PAIN - FUNCTIONAL ASSESSMENT: PAIN_FUNCTIONAL_ASSESSMENT: NONE - DENIES PAIN

## 2024-01-20 NOTE — ED NOTES
ED to Inpatient Handoff Report    Notified [address] that electronic handoff available and patient ready for transport to room 519.    Safety Risks: Home safety issues, Difficulty with daily activities, and Risk of falls    Patient in Restraints: no    Constant Observer or Patient : no    Telemetry Monitoring Ordered :No           Order to transfer to unit without monitor:YES    Last MEWS: 1 Time completed: 1758    Deterioration Index Score:   Predictive Model Details          27 (Normal)  Factor Value    Calculated 1/20/2024 18:02 46% Age 78 years old    Deterioration Index Model 35% Neurological exam X     10% WBC count abnormal (12.8 k/uL)     3% Systolic 121     3% Sodium 135 mmol/L     2% Pulse 60     1% Potassium 3.7 mmol/L     0% Pulse oximetry 97 %     0% Respiratory rate 16     0% Temperature 98.2 °F (36.8 °C)     0% Hematocrit 38.3 %        Vitals:    01/20/24 1420 01/20/24 1630 01/20/24 1758   BP: (!) 111/57 (!) 102/50 121/61   Pulse: 90 76 60   Resp: 16 19 16   Temp: 99.7 °F (37.6 °C)  98.2 °F (36.8 °C)   TempSrc: Oral     SpO2: 98%  97%   Weight: 54.4 kg (120 lb)     Height: 1.6 m (5' 3\")           Opportunity for questions and clarification was provided.

## 2024-01-20 NOTE — ED PROVIDER NOTES
Department of Emergency Medicine     Written by: Corby Polo MD  Patient Name: Babs Lewis  Admit Date: 2024  2:17 PM  MRN: [id number]                   : 1945    HPI  Chief Complaint   Patient presents with    Emesis     Has been throwing up since this morning unsure if pt had fall.        Babs Lewis is a 78 y.o. female that presents to the ED with nausea and a possible fall at facility.  The patient has been throwing up since this morning.  Unsure how many times, unsure pattern.  Nursing home cannot provide history.  Patient is disoriented at baseline ANO x 1.  She is unsure if she hit her head when she fell today.  She reports no chest pain shortness of breath abdominal pain diarrhea constipation.  No recent fevers.    Review of systems:  Pertinent positives and negatives mentioned in the HPI/MDM.    Physical Exam  Constitutional:       General: She is not in acute distress.     Appearance: Normal appearance.   HENT:      Mouth/Throat:      Mouth: Mucous membranes are moist.   Eyes:      Extraocular Movements: Extraocular movements intact.      Pupils: Pupils are equal, round, and reactive to light.   Cardiovascular:      Rate and Rhythm: Normal rate and regular rhythm.      Pulses: Normal pulses.      Heart sounds: Normal heart sounds.   Pulmonary:      Effort: Pulmonary effort is normal. No respiratory distress.      Breath sounds: Normal breath sounds. No stridor. No wheezing.   Abdominal:      Palpations: Abdomen is soft.      Tenderness: There is no abdominal tenderness.   Musculoskeletal:         General: No swelling or tenderness. Normal range of motion.   Skin:     General: Skin is warm.      Capillary Refill: Capillary refill takes less than 2 seconds.      Coloration: Skin is not jaundiced or pale.   Neurological:      Mental Status: She is alert. Mental status is at baseline. She is disoriented.      Cranial Nerves: No cranial nerve deficit.      Sensory: No sensory

## 2024-01-21 LAB
ALBUMIN SERPL-MCNC: 3.4 G/DL (ref 3.5–5.2)
ALP SERPL-CCNC: 43 U/L (ref 35–104)
ALT SERPL-CCNC: 14 U/L (ref 0–32)
ANION GAP SERPL CALCULATED.3IONS-SCNC: 9 MMOL/L (ref 7–16)
AST SERPL-CCNC: 22 U/L (ref 0–31)
BILIRUB SERPL-MCNC: 0.4 MG/DL (ref 0–1.2)
BUN SERPL-MCNC: 25 MG/DL (ref 6–23)
CALCIUM SERPL-MCNC: 8.6 MG/DL (ref 8.6–10.2)
CHLORIDE SERPL-SCNC: 106 MMOL/L (ref 98–107)
CO2 SERPL-SCNC: 22 MMOL/L (ref 22–29)
CREAT SERPL-MCNC: 1.1 MG/DL (ref 0.5–1)
ERYTHROCYTE [DISTWIDTH] IN BLOOD BY AUTOMATED COUNT: 14.2 % (ref 11.5–15)
GFR SERPL CREATININE-BSD FRML MDRD: 52 ML/MIN/1.73M2
GLUCOSE SERPL-MCNC: 108 MG/DL (ref 74–99)
HCT VFR BLD AUTO: 32.5 % (ref 34–48)
HGB BLD-MCNC: 10.9 G/DL (ref 11.5–15.5)
MCH RBC QN AUTO: 31.7 PG (ref 26–35)
MCHC RBC AUTO-ENTMCNC: 33.5 G/DL (ref 32–34.5)
MCV RBC AUTO: 94.5 FL (ref 80–99.9)
PLATELET # BLD AUTO: 161 K/UL (ref 130–450)
PMV BLD AUTO: 9.9 FL (ref 7–12)
POTASSIUM SERPL-SCNC: 3.3 MMOL/L (ref 3.5–5)
PROT SERPL-MCNC: 5.9 G/DL (ref 6.4–8.3)
RBC # BLD AUTO: 3.44 M/UL (ref 3.5–5.5)
SODIUM SERPL-SCNC: 137 MMOL/L (ref 132–146)
WBC OTHER # BLD: 10 K/UL (ref 4.5–11.5)

## 2024-01-21 PROCEDURE — 1200000000 HC SEMI PRIVATE

## 2024-01-21 PROCEDURE — 36415 COLL VENOUS BLD VENIPUNCTURE: CPT

## 2024-01-21 PROCEDURE — 6360000002 HC RX W HCPCS

## 2024-01-21 PROCEDURE — 85027 COMPLETE CBC AUTOMATED: CPT

## 2024-01-21 PROCEDURE — 87449 NOS EACH ORGANISM AG IA: CPT

## 2024-01-21 PROCEDURE — 80053 COMPREHEN METABOLIC PANEL: CPT

## 2024-01-21 PROCEDURE — 87324 CLOSTRIDIUM AG IA: CPT

## 2024-01-21 PROCEDURE — 6370000000 HC RX 637 (ALT 250 FOR IP): Performed by: GENERAL PRACTICE

## 2024-01-21 PROCEDURE — 87493 C DIFF AMPLIFIED PROBE: CPT

## 2024-01-21 PROCEDURE — 2580000003 HC RX 258: Performed by: GENERAL PRACTICE

## 2024-01-21 PROCEDURE — 2580000003 HC RX 258

## 2024-01-21 RX ADMIN — CITALOPRAM HYDROBROMIDE 20 MG: 20 TABLET ORAL at 08:23

## 2024-01-21 RX ADMIN — SODIUM CHLORIDE: 9 INJECTION, SOLUTION INTRAVENOUS at 06:44

## 2024-01-21 RX ADMIN — SODIUM CHLORIDE: 9 INJECTION, SOLUTION INTRAVENOUS at 16:40

## 2024-01-21 RX ADMIN — WATER 1000 MG: 1 INJECTION INTRAMUSCULAR; INTRAVENOUS; SUBCUTANEOUS at 16:40

## 2024-01-22 LAB
ALBUMIN SERPL-MCNC: 3.2 G/DL (ref 3.5–5.2)
ALP SERPL-CCNC: 43 U/L (ref 35–104)
ALT SERPL-CCNC: 10 U/L (ref 0–32)
ANION GAP SERPL CALCULATED.3IONS-SCNC: 10 MMOL/L (ref 7–16)
AST SERPL-CCNC: 14 U/L (ref 0–31)
BILIRUB SERPL-MCNC: 0.3 MG/DL (ref 0–1.2)
BUN SERPL-MCNC: 13 MG/DL (ref 6–23)
C DIFF GDH + TOXINS A+B STL QL IA.RAPID: ABNORMAL
C DIFFICILE TOXINS, PCR: NEGATIVE
CALCIUM SERPL-MCNC: 8.3 MG/DL (ref 8.6–10.2)
CHLORIDE SERPL-SCNC: 105 MMOL/L (ref 98–107)
CO2 SERPL-SCNC: 21 MMOL/L (ref 22–29)
CREAT SERPL-MCNC: 0.6 MG/DL (ref 0.5–1)
EKG ATRIAL RATE: 89 BPM
EKG P AXIS: 68 DEGREES
EKG P-R INTERVAL: 132 MS
EKG Q-T INTERVAL: 336 MS
EKG QRS DURATION: 86 MS
EKG QTC CALCULATION (BAZETT): 408 MS
EKG R AXIS: 55 DEGREES
EKG T AXIS: 43 DEGREES
EKG VENTRICULAR RATE: 89 BPM
ERYTHROCYTE [DISTWIDTH] IN BLOOD BY AUTOMATED COUNT: 13.9 % (ref 11.5–15)
GFR SERPL CREATININE-BSD FRML MDRD: >60 ML/MIN/1.73M2
GLUCOSE SERPL-MCNC: 88 MG/DL (ref 74–99)
HCT VFR BLD AUTO: 29.4 % (ref 34–48)
HGB BLD-MCNC: 9.7 G/DL (ref 11.5–15.5)
MCH RBC QN AUTO: 31.3 PG (ref 26–35)
MCHC RBC AUTO-ENTMCNC: 33 G/DL (ref 32–34.5)
MCV RBC AUTO: 94.8 FL (ref 80–99.9)
MICROORGANISM SPEC CULT: ABNORMAL
PLATELET # BLD AUTO: 151 K/UL (ref 130–450)
PMV BLD AUTO: 10.1 FL (ref 7–12)
POTASSIUM SERPL-SCNC: 3 MMOL/L (ref 3.5–5)
PROT SERPL-MCNC: 5.5 G/DL (ref 6.4–8.3)
RBC # BLD AUTO: 3.1 M/UL (ref 3.5–5.5)
SODIUM SERPL-SCNC: 136 MMOL/L (ref 132–146)
SPECIMEN DESCRIPTION: ABNORMAL
SPECIMEN DESCRIPTION: ABNORMAL
SPECIMEN DESCRIPTION: NORMAL
WBC OTHER # BLD: 7.2 K/UL (ref 4.5–11.5)

## 2024-01-22 PROCEDURE — 6360000002 HC RX W HCPCS

## 2024-01-22 PROCEDURE — 1200000000 HC SEMI PRIVATE

## 2024-01-22 PROCEDURE — 93010 ELECTROCARDIOGRAM REPORT: CPT | Performed by: INTERNAL MEDICINE

## 2024-01-22 PROCEDURE — 2580000003 HC RX 258

## 2024-01-22 PROCEDURE — 80053 COMPREHEN METABOLIC PANEL: CPT

## 2024-01-22 PROCEDURE — 2580000003 HC RX 258: Performed by: GENERAL PRACTICE

## 2024-01-22 PROCEDURE — 36415 COLL VENOUS BLD VENIPUNCTURE: CPT

## 2024-01-22 PROCEDURE — 6370000000 HC RX 637 (ALT 250 FOR IP): Performed by: GENERAL PRACTICE

## 2024-01-22 PROCEDURE — 85027 COMPLETE CBC AUTOMATED: CPT

## 2024-01-22 RX ORDER — POTASSIUM CHLORIDE 20 MEQ/1
20 TABLET, EXTENDED RELEASE ORAL 2 TIMES DAILY WITH MEALS
Status: DISCONTINUED | OUTPATIENT
Start: 2024-01-22 | End: 2024-01-24

## 2024-01-22 RX ADMIN — POTASSIUM CHLORIDE 20 MEQ: 1500 TABLET, EXTENDED RELEASE ORAL at 17:53

## 2024-01-22 RX ADMIN — WATER 1000 MG: 1 INJECTION INTRAMUSCULAR; INTRAVENOUS; SUBCUTANEOUS at 17:53

## 2024-01-22 RX ADMIN — CITALOPRAM HYDROBROMIDE 20 MG: 20 TABLET ORAL at 08:35

## 2024-01-22 RX ADMIN — SODIUM CHLORIDE: 9 INJECTION, SOLUTION INTRAVENOUS at 02:45

## 2024-01-22 RX ADMIN — POTASSIUM CHLORIDE 20 MEQ: 1500 TABLET, EXTENDED RELEASE ORAL at 10:01

## 2024-01-22 NOTE — ACP (ADVANCE CARE PLANNING)
Advance Care Planning   Healthcare Decision Maker:    Primary Decision Maker: Mary Anne Dozier - Child - [phone]    Secondary Decision Maker: Pallai,Diane - Brother/Sister - [phone]    Click here to complete Healthcare Decision Makers including selection of the Healthcare Decision Maker Relationship (ie \"Primary\").  Today we documented Decision Maker(s) consistent with Legal Next of Kin hierarchy.

## 2024-01-22 NOTE — H&P
[id number] [place] [address]                              HISTORY AND PHYSICAL    PATIENT NAME: NAKIA YOUNG                 :        1945  MED REC NO:   [id number]                            ROOM:       0519  ACCOUNT NO:   [id number]                           ADMIT DATE: 2024  PROVIDER:     Jesus Mckeon DO    HISTORY OF PRESENT ILLNESS:  The patient is a 78-year-old white female  well known to me, currently resides at a skilled nursing facility and  she suffers from dementia and she was recently seen by her neurologist;  however, she has been having some trouble falling.  She did strike her  head.  She was found on the floor with vomit on her chest.  She is also  complaining of having some nausea and diarrhea and she feels that maybe  she is having a little dehydration with a new onset of these  gastrointestinal symptoms.    PAST MEDICAL HISTORY:  Significant for mastitis and dementia.    PAST SURGICAL HISTORY:  Lumpectomy.    SOCIAL HISTORY:  Lifelong nonsmoker.  Occasional alcohol.  Does not use  drugs.    FAMILY HISTORY:  Her mother had cancer.  Father had cancer.  Heart  disease in her brother.  High blood pressure in her mother.    MEDICATIONS:  Her home medications were reviewed.    ALLERGIES:  She has no known allergies.    LABORATORY STUDIES:  During this admission, she had a CBC, which showed  a slight elevation of 12.8, the prior one was 12.1.  Basic metabolic  panel did show a mild increase in her creatinine of 1.4.  Troponins were  25.  COVID-19 not detected.  Influenza A and B not detected.  She went  ahead and had some blood cultures.  We got some stool for C. diff.   These are all pending.  She seems to be pleasantly confused and she does  ramble from one subject to the next.  Her potassium was noted to be a  little low.  This was fixed.    DIAGNOSTIC STUDIES:  Chest x-ray:  No significant

## 2024-01-22 NOTE — CARE COORDINATION
Patient is alert to self only, assessment was provided by patients daughter Mary Anne. [facility]. Patient baseline is confused, has a history of dementia, nut daughter states her current mentation is worse than her baseline. Patient is usually independent but daughter states she has been weak for the past couple of months. Patient uses no DME. PT/OT ordered for recommendations and evals. SNF list and HHC list given to the patient at bedside for her daughter to review. Await PT/OT evals for recommendations. Patient currently on IV rocephin and IVF.   Electronically signed by Gracia Bassett RN on 1/22/2024 at 12:18 PM

## 2024-01-22 NOTE — PATIENT CARE CONFERENCE
P Quality Flow/Interdisciplinary Rounds Progress Note        Quality Flow Rounds held on January 22, 2024    Disciplines Attending:  Bedside Nurse, , , and Nursing Unit Leadership    Babs Lewis was admitted on 1/20/2024  2:17 PM    Anticipated Discharge Date:       Disposition:    Valentin Score:  Valentin Scale Score: 19    Readmission Risk              Risk of Unplanned Readmission:  11           Discussed patient goal for the day, patient clinical progression, and barriers to discharge.  The following Goal(s) of the Day/Commitment(s) have been identified:  Discharge - Obtain Order planning      Lolis Munroe RN  January 22, 2024

## 2024-01-23 LAB
ALBUMIN SERPL-MCNC: 3.1 G/DL (ref 3.5–5.2)
ALP SERPL-CCNC: 44 U/L (ref 35–104)
ALT SERPL-CCNC: 11 U/L (ref 0–32)
ANION GAP SERPL CALCULATED.3IONS-SCNC: 10 MMOL/L (ref 7–16)
AST SERPL-CCNC: 14 U/L (ref 0–31)
BILIRUB SERPL-MCNC: 0.3 MG/DL (ref 0–1.2)
BUN SERPL-MCNC: 6 MG/DL (ref 6–23)
CALCIUM SERPL-MCNC: 8.1 MG/DL (ref 8.6–10.2)
CHLORIDE SERPL-SCNC: 108 MMOL/L (ref 98–107)
CO2 SERPL-SCNC: 20 MMOL/L (ref 22–29)
CREAT SERPL-MCNC: 0.5 MG/DL (ref 0.5–1)
ERYTHROCYTE [DISTWIDTH] IN BLOOD BY AUTOMATED COUNT: 13.6 % (ref 11.5–15)
GFR SERPL CREATININE-BSD FRML MDRD: >60 ML/MIN/1.73M2
GLUCOSE SERPL-MCNC: 88 MG/DL (ref 74–99)
HCT VFR BLD AUTO: 28.4 % (ref 34–48)
HGB BLD-MCNC: 9.6 G/DL (ref 11.5–15.5)
MCH RBC QN AUTO: 30.9 PG (ref 26–35)
MCHC RBC AUTO-ENTMCNC: 33.8 G/DL (ref 32–34.5)
MCV RBC AUTO: 91.3 FL (ref 80–99.9)
PLATELET # BLD AUTO: 154 K/UL (ref 130–450)
PMV BLD AUTO: 10.4 FL (ref 7–12)
POTASSIUM SERPL-SCNC: 3 MMOL/L (ref 3.5–5)
PROT SERPL-MCNC: 5.4 G/DL (ref 6.4–8.3)
RBC # BLD AUTO: 3.11 M/UL (ref 3.5–5.5)
SODIUM SERPL-SCNC: 138 MMOL/L (ref 132–146)
WBC OTHER # BLD: 6.4 K/UL (ref 4.5–11.5)

## 2024-01-23 PROCEDURE — 97165 OT EVAL LOW COMPLEX 30 MIN: CPT

## 2024-01-23 PROCEDURE — 97530 THERAPEUTIC ACTIVITIES: CPT

## 2024-01-23 PROCEDURE — 2580000003 HC RX 258

## 2024-01-23 PROCEDURE — 85027 COMPLETE CBC AUTOMATED: CPT

## 2024-01-23 PROCEDURE — 6370000000 HC RX 637 (ALT 250 FOR IP): Performed by: GENERAL PRACTICE

## 2024-01-23 PROCEDURE — 97161 PT EVAL LOW COMPLEX 20 MIN: CPT

## 2024-01-23 PROCEDURE — 80053 COMPREHEN METABOLIC PANEL: CPT

## 2024-01-23 PROCEDURE — 36415 COLL VENOUS BLD VENIPUNCTURE: CPT

## 2024-01-23 PROCEDURE — 6360000002 HC RX W HCPCS

## 2024-01-23 PROCEDURE — 1200000000 HC SEMI PRIVATE

## 2024-01-23 PROCEDURE — 97535 SELF CARE MNGMENT TRAINING: CPT

## 2024-01-23 RX ADMIN — WATER 1000 MG: 1 INJECTION INTRAMUSCULAR; INTRAVENOUS; SUBCUTANEOUS at 16:27

## 2024-01-23 RX ADMIN — POTASSIUM CHLORIDE 20 MEQ: 1500 TABLET, EXTENDED RELEASE ORAL at 16:27

## 2024-01-23 RX ADMIN — POTASSIUM CHLORIDE 20 MEQ: 1500 TABLET, EXTENDED RELEASE ORAL at 09:45

## 2024-01-23 RX ADMIN — CITALOPRAM HYDROBROMIDE 20 MG: 20 TABLET ORAL at 09:45

## 2024-01-23 NOTE — CARE COORDINATION
Updated plan of care. Patient is from [place]. Patient mentation baseline is confused. Spoke to [facility], patient can return. Spoke to the daughter Mary Anne, She states her discharge plan is for her mother to return to [facility] with [facility]. No preference of [facility] agency. [facility] called and a referral was made. Await input. Patient currently on IV rocephin. Daughter states her bother Casey may be able to transport the patient home when ready to discharge.   Electronically signed by Gracia Bassett RN on 1/23/2024 at 10:18 AM    UPDATE: [facility] is able to accept. Please notify [facility] and [facility] when patient is discharging.   Electronically signed by Gracia Bassett RN on 1/23/2024 at 10:37 AM

## 2024-01-23 NOTE — PATIENT CARE CONFERENCE
P Quality Flow/Interdisciplinary Rounds Progress Note        Quality Flow Rounds held on January 23, 2024    Disciplines Attending:  Bedside Nurse, , , and Nursing Unit Leadership    Babs Lewis was admitted on 1/20/2024  2:17 PM    Anticipated Discharge Date:       Disposition:    Valentin Score:  Valentin Scale Score: 22    Readmission Risk              Risk of Unplanned Readmission:  11           Discussed patient goal for the day, patient clinical progression, and barriers to discharge.  The following Goal(s) of the Day/Commitment(s) have been identified:  Discharge - Obtain Order planning      Lolis Munroe RN  January 23, 2024

## 2024-01-24 LAB
ALBUMIN SERPL-MCNC: 3.1 G/DL (ref 3.5–5.2)
ALP SERPL-CCNC: 44 U/L (ref 35–104)
ALT SERPL-CCNC: 10 U/L (ref 0–32)
ANION GAP SERPL CALCULATED.3IONS-SCNC: 15 MMOL/L (ref 7–16)
AST SERPL-CCNC: 13 U/L (ref 0–31)
BILIRUB SERPL-MCNC: 0.4 MG/DL (ref 0–1.2)
BUN SERPL-MCNC: 3 MG/DL (ref 6–23)
CALCIUM SERPL-MCNC: 8 MG/DL (ref 8.6–10.2)
CHLORIDE SERPL-SCNC: 106 MMOL/L (ref 98–107)
CO2 SERPL-SCNC: 21 MMOL/L (ref 22–29)
CREAT SERPL-MCNC: 0.5 MG/DL (ref 0.5–1)
ERYTHROCYTE [DISTWIDTH] IN BLOOD BY AUTOMATED COUNT: 13.2 % (ref 11.5–15)
GFR SERPL CREATININE-BSD FRML MDRD: >60 ML/MIN/1.73M2
GLUCOSE SERPL-MCNC: 81 MG/DL (ref 74–99)
HCT VFR BLD AUTO: 29.2 % (ref 34–48)
HGB BLD-MCNC: 10 G/DL (ref 11.5–15.5)
MCH RBC QN AUTO: 30.9 PG (ref 26–35)
MCHC RBC AUTO-ENTMCNC: 34.2 G/DL (ref 32–34.5)
MCV RBC AUTO: 90.1 FL (ref 80–99.9)
PLATELET # BLD AUTO: 176 K/UL (ref 130–450)
PMV BLD AUTO: 10.5 FL (ref 7–12)
POTASSIUM SERPL-SCNC: 2.8 MMOL/L (ref 3.5–5)
PROT SERPL-MCNC: 5.4 G/DL (ref 6.4–8.3)
RBC # BLD AUTO: 3.24 M/UL (ref 3.5–5.5)
SODIUM SERPL-SCNC: 142 MMOL/L (ref 132–146)
WBC OTHER # BLD: 7 K/UL (ref 4.5–11.5)

## 2024-01-24 PROCEDURE — 2580000003 HC RX 258

## 2024-01-24 PROCEDURE — 97530 THERAPEUTIC ACTIVITIES: CPT

## 2024-01-24 PROCEDURE — 1200000000 HC SEMI PRIVATE

## 2024-01-24 PROCEDURE — 6360000002 HC RX W HCPCS

## 2024-01-24 PROCEDURE — 6370000000 HC RX 637 (ALT 250 FOR IP): Performed by: GENERAL PRACTICE

## 2024-01-24 PROCEDURE — 36415 COLL VENOUS BLD VENIPUNCTURE: CPT

## 2024-01-24 PROCEDURE — 80053 COMPREHEN METABOLIC PANEL: CPT

## 2024-01-24 PROCEDURE — 85027 COMPLETE CBC AUTOMATED: CPT

## 2024-01-24 RX ORDER — DONEPEZIL HYDROCHLORIDE 10 MG/1
5 TABLET, FILM COATED ORAL NIGHTLY
Status: DISCONTINUED | OUTPATIENT
Start: 2024-01-24 | End: 2024-01-29 | Stop reason: HOSPADM

## 2024-01-24 RX ORDER — AMLODIPINE BESYLATE 5 MG/1
5 TABLET ORAL DAILY
Status: DISCONTINUED | OUTPATIENT
Start: 2024-01-24 | End: 2024-01-29 | Stop reason: HOSPADM

## 2024-01-24 RX ORDER — POTASSIUM CHLORIDE 20 MEQ/1
40 TABLET, EXTENDED RELEASE ORAL 2 TIMES DAILY WITH MEALS
Status: DISCONTINUED | OUTPATIENT
Start: 2024-01-24 | End: 2024-01-29 | Stop reason: HOSPADM

## 2024-01-24 RX ADMIN — WATER 1000 MG: 1 INJECTION INTRAMUSCULAR; INTRAVENOUS; SUBCUTANEOUS at 18:24

## 2024-01-24 RX ADMIN — DONEPEZIL HYDROCHLORIDE 5 MG: 10 TABLET, FILM COATED ORAL at 20:59

## 2024-01-24 RX ADMIN — POTASSIUM CHLORIDE 40 MEQ: 1500 TABLET, EXTENDED RELEASE ORAL at 18:23

## 2024-01-24 RX ADMIN — POTASSIUM CHLORIDE 20 MEQ: 1500 TABLET, EXTENDED RELEASE ORAL at 08:57

## 2024-01-24 RX ADMIN — CITALOPRAM HYDROBROMIDE 20 MG: 20 TABLET ORAL at 08:57

## 2024-01-24 RX ADMIN — AMLODIPINE BESYLATE 5 MG: 5 TABLET ORAL at 11:03

## 2024-01-24 NOTE — CARE COORDINATION
Updated plan of care. Patient is from [place]. Patient mentation baseline is confused. Spoke to [facility], patient can return. Spoke to the daughter Mary Anne, She states her discharge plan is for her mother to return to [facility] with [facility].  [facility] is able to accept. Please notify [facility] and [facility] when patient is discharging.  Patient currently on IV rocephin. Family Casey may be able to transport at discharge.   Electronically signed by Gracia Bassett RN on 1/24/2024 at 2:00 PM

## 2024-01-25 ENCOUNTER — APPOINTMENT (OUTPATIENT)
Dept: ULTRASOUND IMAGING | Age: 79
DRG: 682 | End: 2024-01-25
Payer: MEDICARE

## 2024-01-25 LAB
ALBUMIN SERPL-MCNC: 3.1 G/DL (ref 3.5–5.2)
ALP SERPL-CCNC: 40 U/L (ref 35–104)
ALT SERPL-CCNC: 9 U/L (ref 0–32)
ANION GAP SERPL CALCULATED.3IONS-SCNC: 11 MMOL/L (ref 7–16)
ANION GAP SERPL CALCULATED.3IONS-SCNC: 12 MMOL/L (ref 7–16)
AST SERPL-CCNC: 11 U/L (ref 0–31)
BILIRUB SERPL-MCNC: 0.4 MG/DL (ref 0–1.2)
BUN SERPL-MCNC: 2 MG/DL (ref 6–23)
BUN SERPL-MCNC: <1 MG/DL (ref 6–23)
CALCIUM SERPL-MCNC: 8 MG/DL (ref 8.6–10.2)
CALCIUM SERPL-MCNC: 8.1 MG/DL (ref 8.6–10.2)
CHLORIDE SERPL-SCNC: 100 MMOL/L (ref 98–107)
CHLORIDE SERPL-SCNC: 102 MMOL/L (ref 98–107)
CO2 SERPL-SCNC: 24 MMOL/L (ref 22–29)
CO2 SERPL-SCNC: 25 MMOL/L (ref 22–29)
CREAT SERPL-MCNC: 0.5 MG/DL (ref 0.5–1)
CREAT SERPL-MCNC: 0.5 MG/DL (ref 0.5–1)
ERYTHROCYTE [DISTWIDTH] IN BLOOD BY AUTOMATED COUNT: 12.9 % (ref 11.5–15)
GFR SERPL CREATININE-BSD FRML MDRD: >60 ML/MIN/1.73M2
GFR SERPL CREATININE-BSD FRML MDRD: >60 ML/MIN/1.73M2
GLUCOSE SERPL-MCNC: 116 MG/DL (ref 74–99)
GLUCOSE SERPL-MCNC: 84 MG/DL (ref 74–99)
HCT VFR BLD AUTO: 29.4 % (ref 34–48)
HGB BLD-MCNC: 10.2 G/DL (ref 11.5–15.5)
MAGNESIUM SERPL-MCNC: 1.7 MG/DL (ref 1.6–2.6)
MCH RBC QN AUTO: 31.3 PG (ref 26–35)
MCHC RBC AUTO-ENTMCNC: 34.7 G/DL (ref 32–34.5)
MCV RBC AUTO: 90.2 FL (ref 80–99.9)
PHOSPHATE SERPL-MCNC: 2.4 MG/DL (ref 2.5–4.5)
PLATELET # BLD AUTO: 187 K/UL (ref 130–450)
PMV BLD AUTO: 10.2 FL (ref 7–12)
POTASSIUM SERPL-SCNC: 2.8 MMOL/L (ref 3.5–5)
POTASSIUM SERPL-SCNC: 3.3 MMOL/L (ref 3.5–5)
PROT SERPL-MCNC: 5.3 G/DL (ref 6.4–8.3)
RBC # BLD AUTO: 3.26 M/UL (ref 3.5–5.5)
SODIUM SERPL-SCNC: 136 MMOL/L (ref 132–146)
SODIUM SERPL-SCNC: 138 MMOL/L (ref 132–146)
WBC OTHER # BLD: 7.3 K/UL (ref 4.5–11.5)

## 2024-01-25 PROCEDURE — 6360000002 HC RX W HCPCS

## 2024-01-25 PROCEDURE — 6370000000 HC RX 637 (ALT 250 FOR IP)

## 2024-01-25 PROCEDURE — 80048 BASIC METABOLIC PNL TOTAL CA: CPT

## 2024-01-25 PROCEDURE — 36415 COLL VENOUS BLD VENIPUNCTURE: CPT

## 2024-01-25 PROCEDURE — 97530 THERAPEUTIC ACTIVITIES: CPT

## 2024-01-25 PROCEDURE — 6370000000 HC RX 637 (ALT 250 FOR IP): Performed by: GENERAL PRACTICE

## 2024-01-25 PROCEDURE — 1200000000 HC SEMI PRIVATE

## 2024-01-25 PROCEDURE — 2580000003 HC RX 258

## 2024-01-25 PROCEDURE — 85027 COMPLETE CBC AUTOMATED: CPT

## 2024-01-25 PROCEDURE — 83735 ASSAY OF MAGNESIUM: CPT

## 2024-01-25 PROCEDURE — 84100 ASSAY OF PHOSPHORUS: CPT

## 2024-01-25 PROCEDURE — 76770 US EXAM ABDO BACK WALL COMP: CPT

## 2024-01-25 PROCEDURE — 80053 COMPREHEN METABOLIC PANEL: CPT

## 2024-01-25 RX ORDER — POTASSIUM CHLORIDE 7.45 MG/ML
10 INJECTION INTRAVENOUS
Status: COMPLETED | OUTPATIENT
Start: 2024-01-25 | End: 2024-01-25

## 2024-01-25 RX ADMIN — WATER 1000 MG: 1 INJECTION INTRAMUSCULAR; INTRAVENOUS; SUBCUTANEOUS at 17:15

## 2024-01-25 RX ADMIN — POTASSIUM CHLORIDE 10 MEQ: 7.46 INJECTION, SOLUTION INTRAVENOUS at 10:58

## 2024-01-25 RX ADMIN — POTASSIUM & SODIUM PHOSPHATES POWDER PACK 280-160-250 MG 250 MG: 280-160-250 PACK at 14:19

## 2024-01-25 RX ADMIN — POTASSIUM CHLORIDE 10 MEQ: 7.46 INJECTION, SOLUTION INTRAVENOUS at 12:02

## 2024-01-25 RX ADMIN — POTASSIUM CHLORIDE 40 MEQ: 1500 TABLET, EXTENDED RELEASE ORAL at 08:43

## 2024-01-25 RX ADMIN — DONEPEZIL HYDROCHLORIDE 5 MG: 10 TABLET, FILM COATED ORAL at 20:56

## 2024-01-25 RX ADMIN — CITALOPRAM HYDROBROMIDE 20 MG: 20 TABLET ORAL at 08:43

## 2024-01-25 RX ADMIN — POTASSIUM CHLORIDE 40 MEQ: 1500 TABLET, EXTENDED RELEASE ORAL at 17:21

## 2024-01-25 RX ADMIN — AMLODIPINE BESYLATE 5 MG: 5 TABLET ORAL at 08:44

## 2024-01-25 NOTE — CARE COORDINATION
Updated plan of care. Patient discharge plan is back to [facility] with [facility].  Please notify [facility] and [place] when patient is discharging.  Daughter states her bother Casey may be able to transport the patient home when ready to discharge. Await primary plan.   Electronically signed by Gracia Bassett RN on 1/25/2024 at 12:39 PM

## 2024-01-25 NOTE — CONSULTS
[facility]         Nephrology Consult Note    Patient's Name: Babs Lewis     Reason for Consult: hypokalemia    Chief Complaint: nausea, fall  History Obtained from: patient, electronic medical record, past medical records, family member - sister Rosmery     History of Present Illness:     Babs Lewis is a 78 year old female, with past medical history of dementia, mastitis, and falls, who presented to the ED on 1/20, from her skilled nursing facility, with nausea, diarrhea, and a possible fall. Vital signs on 1/20 include temperature 99.7, pulse 90, RR 16, 111/57, 98% SPO2 on room air. Lab data on 1/20 includes sodium 135, potassium 3.7, CO2 24, BUN 22, creatinine 1.4, calcium 8.9, troponin 25, WBC 12.8, Hgb 12.8. Imaging on 1/20 includes chest xray, which showed no acute process, CT head wo contrast, which showed no acute intracranial abnormality, left sphenoid sinusitis, signs of deep white matter small vessel disease, and CT spine wo contrast, which showed no acute abnormality of the cervical spine, signs of multilevel degenerative disc disease, most significant at the  C5-C6, C6-C7 levels, advanced cervical facet arthropathy on the right at the C2-C3 through C5-C6 region.  Nephrology was consulted to see the patient for hypokalemia. The patient is not known to our service.  At present, patient was seen and examined. She is resting comfortably in bed, sister Rosmery at the bedside. Rosmery reports that the patient has visited the ER 3 times in the last 8 days due to not eating or drinking well at her facility. The patient does not remember this and believes that she has been eating and drinking pretty well. Rosmery feels that the patient really has not eaten or drank much of anything for the last week. The patient denies any pain. She denies shortness of breath. She denies n/v/d. Rosmery reports that she does not believe the patient fell at the facility but is unsure as no one witnessed the fall.

## 2024-01-26 PROBLEM — E43 SEVERE PROTEIN-CALORIE MALNUTRITION (HCC): Status: ACTIVE | Noted: 2024-01-26

## 2024-01-26 LAB
25(OH)D3 SERPL-MCNC: 16.4 NG/ML (ref 30–100)
ALBUMIN SERPL-MCNC: 3.3 G/DL (ref 3.5–5.2)
ALP SERPL-CCNC: 44 U/L (ref 35–104)
ALT SERPL-CCNC: 7 U/L (ref 0–32)
ANION GAP SERPL CALCULATED.3IONS-SCNC: 11 MMOL/L (ref 7–16)
AST SERPL-CCNC: 10 U/L (ref 0–31)
BILIRUB SERPL-MCNC: 0.4 MG/DL (ref 0–1.2)
BUN SERPL-MCNC: <1 MG/DL (ref 6–23)
CA-I BLD-SCNC: 1.16 MMOL/L (ref 1.15–1.33)
CALCIUM SERPL-MCNC: 8.5 MG/DL (ref 8.6–10.2)
CHLORIDE SERPL-SCNC: 101 MMOL/L (ref 98–107)
CO2 SERPL-SCNC: 27 MMOL/L (ref 22–29)
CREAT SERPL-MCNC: 0.5 MG/DL (ref 0.5–1)
CREAT UR-MCNC: 33.4 MG/DL (ref 29–226)
ERYTHROCYTE [DISTWIDTH] IN BLOOD BY AUTOMATED COUNT: 13.3 % (ref 11.5–15)
FERRITIN SERPL-MCNC: 159 NG/ML
FOLATE SERPL-MCNC: 10.7 NG/ML (ref 4.8–24.2)
GFR SERPL CREATININE-BSD FRML MDRD: >60 ML/MIN/1.73M2
GLUCOSE SERPL-MCNC: 95 MG/DL (ref 74–99)
HCT VFR BLD AUTO: 31.2 % (ref 34–48)
HGB BLD-MCNC: 11 G/DL (ref 11.5–15.5)
IRON SATN MFR SERPL: 18 % (ref 15–50)
IRON SERPL-MCNC: 33 UG/DL (ref 37–145)
MAGNESIUM SERPL-MCNC: 1.9 MG/DL (ref 1.6–2.6)
MCH RBC QN AUTO: 31.4 PG (ref 26–35)
MCHC RBC AUTO-ENTMCNC: 35.3 G/DL (ref 32–34.5)
MCV RBC AUTO: 89.1 FL (ref 80–99.9)
MICROORGANISM SPEC CULT: NORMAL
MICROORGANISM SPEC CULT: NORMAL
PHOSPHATE SERPL-MCNC: 3.2 MG/DL (ref 2.5–4.5)
PLATELET # BLD AUTO: 252 K/UL (ref 130–450)
PMV BLD AUTO: 9.7 FL (ref 7–12)
POTASSIUM SERPL-SCNC: 3 MMOL/L (ref 3.5–5)
POTASSIUM, UR: 11.2 MMOL/L
PROT SERPL-MCNC: 5.8 G/DL (ref 6.4–8.3)
PTH-INTACT SERPL-MCNC: 37.1 PG/ML (ref 15–65)
RBC # BLD AUTO: 3.5 M/UL (ref 3.5–5.5)
SERVICE CMNT-IMP: NORMAL
SERVICE CMNT-IMP: NORMAL
SODIUM SERPL-SCNC: 139 MMOL/L (ref 132–146)
SODIUM UR-SCNC: 127 MMOL/L
SPECIMEN DESCRIPTION: NORMAL
SPECIMEN DESCRIPTION: NORMAL
TIBC SERPL-MCNC: 184 UG/DL (ref 250–450)
VIT B12 SERPL-MCNC: 791 PG/ML (ref 211–946)
WBC OTHER # BLD: 7.3 K/UL (ref 4.5–11.5)

## 2024-01-26 PROCEDURE — 84133 ASSAY OF URINE POTASSIUM: CPT

## 2024-01-26 PROCEDURE — 82746 ASSAY OF FOLIC ACID SERUM: CPT

## 2024-01-26 PROCEDURE — 82570 ASSAY OF URINE CREATININE: CPT

## 2024-01-26 PROCEDURE — 6370000000 HC RX 637 (ALT 250 FOR IP): Performed by: STUDENT IN AN ORGANIZED HEALTH CARE EDUCATION/TRAINING PROGRAM

## 2024-01-26 PROCEDURE — 2580000003 HC RX 258

## 2024-01-26 PROCEDURE — 1200000000 HC SEMI PRIVATE

## 2024-01-26 PROCEDURE — 82088 ASSAY OF ALDOSTERONE: CPT

## 2024-01-26 PROCEDURE — 82306 VITAMIN D 25 HYDROXY: CPT

## 2024-01-26 PROCEDURE — 82330 ASSAY OF CALCIUM: CPT

## 2024-01-26 PROCEDURE — 82728 ASSAY OF FERRITIN: CPT

## 2024-01-26 PROCEDURE — 83540 ASSAY OF IRON: CPT

## 2024-01-26 PROCEDURE — 6370000000 HC RX 637 (ALT 250 FOR IP): Performed by: GENERAL PRACTICE

## 2024-01-26 PROCEDURE — 83735 ASSAY OF MAGNESIUM: CPT

## 2024-01-26 PROCEDURE — 83970 ASSAY OF PARATHORMONE: CPT

## 2024-01-26 PROCEDURE — 6370000000 HC RX 637 (ALT 250 FOR IP)

## 2024-01-26 PROCEDURE — 80053 COMPREHEN METABOLIC PANEL: CPT

## 2024-01-26 PROCEDURE — 84100 ASSAY OF PHOSPHORUS: CPT

## 2024-01-26 PROCEDURE — 84300 ASSAY OF URINE SODIUM: CPT

## 2024-01-26 PROCEDURE — 83550 IRON BINDING TEST: CPT

## 2024-01-26 PROCEDURE — 6360000002 HC RX W HCPCS

## 2024-01-26 PROCEDURE — 82607 VITAMIN B-12: CPT

## 2024-01-26 PROCEDURE — 84244 ASSAY OF RENIN: CPT

## 2024-01-26 PROCEDURE — 85027 COMPLETE CBC AUTOMATED: CPT

## 2024-01-26 RX ORDER — POTASSIUM CHLORIDE 7.45 MG/ML
10 INJECTION INTRAVENOUS
Status: COMPLETED | OUTPATIENT
Start: 2024-01-26 | End: 2024-01-26

## 2024-01-26 RX ORDER — LOSARTAN POTASSIUM 50 MG/1
50 TABLET ORAL DAILY
Status: DISCONTINUED | OUTPATIENT
Start: 2024-01-26 | End: 2024-01-29 | Stop reason: HOSPADM

## 2024-01-26 RX ADMIN — POTASSIUM CHLORIDE 40 MEQ: 1500 TABLET, EXTENDED RELEASE ORAL at 10:00

## 2024-01-26 RX ADMIN — POTASSIUM & SODIUM PHOSPHATES POWDER PACK 280-160-250 MG 250 MG: 280-160-250 PACK at 10:00

## 2024-01-26 RX ADMIN — AMLODIPINE BESYLATE 5 MG: 5 TABLET ORAL at 10:00

## 2024-01-26 RX ADMIN — POTASSIUM CHLORIDE 10 MEQ: 7.46 INJECTION, SOLUTION INTRAVENOUS at 11:25

## 2024-01-26 RX ADMIN — POTASSIUM CHLORIDE 10 MEQ: 7.46 INJECTION, SOLUTION INTRAVENOUS at 12:53

## 2024-01-26 RX ADMIN — POTASSIUM CHLORIDE 10 MEQ: 7.46 INJECTION, SOLUTION INTRAVENOUS at 14:38

## 2024-01-26 RX ADMIN — DONEPEZIL HYDROCHLORIDE 5 MG: 10 TABLET, FILM COATED ORAL at 19:59

## 2024-01-26 RX ADMIN — POTASSIUM CHLORIDE 10 MEQ: 7.46 INJECTION, SOLUTION INTRAVENOUS at 16:45

## 2024-01-26 RX ADMIN — POTASSIUM CHLORIDE 40 MEQ: 1500 TABLET, EXTENDED RELEASE ORAL at 16:44

## 2024-01-26 RX ADMIN — WATER 1000 MG: 1 INJECTION INTRAMUSCULAR; INTRAVENOUS; SUBCUTANEOUS at 16:45

## 2024-01-26 RX ADMIN — LOSARTAN POTASSIUM 50 MG: 50 TABLET, FILM COATED ORAL at 12:47

## 2024-01-26 RX ADMIN — CITALOPRAM HYDROBROMIDE 20 MG: 20 TABLET ORAL at 10:00

## 2024-01-26 NOTE — CONSULTS
Comprehensive Nutrition Assessment    Type and Reason for Visit:  Initial, Consult    Nutrition Recommendations/Plan:   Continue current diet & monitor  Consider appetite stimulant  Will add Ensure Compact TID  If PO intake remains poor may need to consider alternative means of nutrition as appropriate with goals of care     Malnutrition Assessment:  Malnutrition Status:  Severe malnutrition (01/26/24 0192)    Context:  Chronic Illness     Findings of the 6 clinical characteristics of malnutrition:  Energy Intake:  75% or less estimated energy requirements for 1 month or longer  Weight Loss:  Unable to assess (d/t lack of actual wt hx per EMR)     Body Fat Loss:  Severe body fat loss Orbital, Triceps, Buccal region   Muscle Mass Loss:  Severe muscle mass loss Temples (temporalis), Clavicles (pectoralis & deltoids), Hand (interosseous)  Fluid Accumulation:  No significant fluid accumulation     Strength:  Not Performed    Nutrition Assessment:    [place] s/p possible [place]. Pt w/ intractable N/V, BETHANY-resolved. Hx dementia. Note hypokalemia in the setting of poor oral intake. Pt w/ severe malnutrition, will add ONS & monitor.    Nutrition Related Findings:    A&O X1, +5.6L, no edema, abd soft/flat, +BS, K+ 3   Wound Type: None       Current Nutrition Intake & Therapies:    Average Meal Intake: 0%, 1-25%  Average Supplements Intake: None Ordered  ADULT DIET; Regular    Anthropometric Measures:  Height: 160 cm (5' 3\")  Ideal Body Weight (IBW): 115 lbs (52 kg)    Admission Body Weight: 51.7 kg (113 lb 15.7 oz) (1/22 bed)  Current Body Weight: 50.2 kg (110 lb 10.7 oz) (1/26), 96.2 % IBW. Weight Source: Bed Scale  Current BMI (kg/m2): 19.6  Usual Body Weight:  (no actual EMR wt hx <1 yr, 2/6/23 125# OV no method)                       BMI Categories: Underweight (BMI less than 22) age over 65    Nutrition Diagnosis:   Severe malnutrition, In context of chronic illness related to cognitive or neurological

## 2024-01-26 NOTE — CARE COORDINATION
Updated plan of care. Patient discharge plan is back to [facility].  Please notify [facility] P:[phone] and [place] P: [phone]  when patient is discharging.  Daughter states her bother Casey may be able to transport the patient home when ready to discharge.    Electronically signed by Gracia Bassett RN on 1/26/2024 at 1:45 PM

## 2024-01-27 LAB
ALBUMIN SERPL-MCNC: 3.5 G/DL (ref 3.5–5.2)
ALP SERPL-CCNC: 52 U/L (ref 35–104)
ALT SERPL-CCNC: 9 U/L (ref 0–32)
ANION GAP SERPL CALCULATED.3IONS-SCNC: 10 MMOL/L (ref 7–16)
AST SERPL-CCNC: 12 U/L (ref 0–31)
BILIRUB SERPL-MCNC: 0.5 MG/DL (ref 0–1.2)
BUN SERPL-MCNC: 3 MG/DL (ref 6–23)
CA-I BLD-SCNC: 1.2 MMOL/L (ref 1.15–1.33)
CALCIUM SERPL-MCNC: 8.7 MG/DL (ref 8.6–10.2)
CHLORIDE SERPL-SCNC: 103 MMOL/L (ref 98–107)
CO2 SERPL-SCNC: 25 MMOL/L (ref 22–29)
CREAT SERPL-MCNC: 0.6 MG/DL (ref 0.5–1)
ERYTHROCYTE [DISTWIDTH] IN BLOOD BY AUTOMATED COUNT: 13.7 % (ref 11.5–15)
GFR SERPL CREATININE-BSD FRML MDRD: >60 ML/MIN/1.73M2
GLUCOSE SERPL-MCNC: 93 MG/DL (ref 74–99)
HCT VFR BLD AUTO: 35.3 % (ref 34–48)
HGB BLD-MCNC: 11.9 G/DL (ref 11.5–15.5)
MAGNESIUM SERPL-MCNC: 1.9 MG/DL (ref 1.6–2.6)
MCH RBC QN AUTO: 30.5 PG (ref 26–35)
MCHC RBC AUTO-ENTMCNC: 33.7 G/DL (ref 32–34.5)
MCV RBC AUTO: 90.5 FL (ref 80–99.9)
PHOSPHATE SERPL-MCNC: 3.4 MG/DL (ref 2.5–4.5)
PLATELET # BLD AUTO: 340 K/UL (ref 130–450)
PMV BLD AUTO: 9.9 FL (ref 7–12)
POTASSIUM SERPL-SCNC: 3.7 MMOL/L (ref 3.5–5)
PROT SERPL-MCNC: 6.2 G/DL (ref 6.4–8.3)
RBC # BLD AUTO: 3.9 M/UL (ref 3.5–5.5)
SODIUM SERPL-SCNC: 138 MMOL/L (ref 132–146)
WBC OTHER # BLD: 9.1 K/UL (ref 4.5–11.5)

## 2024-01-27 PROCEDURE — 82330 ASSAY OF CALCIUM: CPT

## 2024-01-27 PROCEDURE — 1200000000 HC SEMI PRIVATE

## 2024-01-27 PROCEDURE — 6370000000 HC RX 637 (ALT 250 FOR IP): Performed by: INTERNAL MEDICINE

## 2024-01-27 PROCEDURE — 2580000003 HC RX 258

## 2024-01-27 PROCEDURE — 6370000000 HC RX 637 (ALT 250 FOR IP): Performed by: GENERAL PRACTICE

## 2024-01-27 PROCEDURE — 83735 ASSAY OF MAGNESIUM: CPT

## 2024-01-27 PROCEDURE — 6370000000 HC RX 637 (ALT 250 FOR IP): Performed by: STUDENT IN AN ORGANIZED HEALTH CARE EDUCATION/TRAINING PROGRAM

## 2024-01-27 PROCEDURE — 80053 COMPREHEN METABOLIC PANEL: CPT

## 2024-01-27 PROCEDURE — 85027 COMPLETE CBC AUTOMATED: CPT

## 2024-01-27 PROCEDURE — 6360000002 HC RX W HCPCS

## 2024-01-27 PROCEDURE — 84100 ASSAY OF PHOSPHORUS: CPT

## 2024-01-27 RX ORDER — MIRTAZAPINE 15 MG/1
15 TABLET, FILM COATED ORAL NIGHTLY
Status: DISCONTINUED | OUTPATIENT
Start: 2024-01-27 | End: 2024-01-29 | Stop reason: HOSPADM

## 2024-01-27 RX ORDER — FERROUS SULFATE 325(65) MG
325 TABLET ORAL
Status: DISCONTINUED | OUTPATIENT
Start: 2024-01-28 | End: 2024-01-29 | Stop reason: HOSPADM

## 2024-01-27 RX ORDER — CHOLECALCIFEROL (VITAMIN D3) 50 MCG
4000 TABLET ORAL WEEKLY
Status: DISCONTINUED | OUTPATIENT
Start: 2024-01-27 | End: 2024-01-29 | Stop reason: HOSPADM

## 2024-01-27 RX ADMIN — AMLODIPINE BESYLATE 5 MG: 5 TABLET ORAL at 08:22

## 2024-01-27 RX ADMIN — POTASSIUM CHLORIDE 40 MEQ: 1500 TABLET, EXTENDED RELEASE ORAL at 08:22

## 2024-01-27 RX ADMIN — WATER 1000 MG: 1 INJECTION INTRAMUSCULAR; INTRAVENOUS; SUBCUTANEOUS at 17:35

## 2024-01-27 RX ADMIN — DONEPEZIL HYDROCHLORIDE 5 MG: 10 TABLET, FILM COATED ORAL at 20:45

## 2024-01-27 RX ADMIN — POTASSIUM CHLORIDE 40 MEQ: 1500 TABLET, EXTENDED RELEASE ORAL at 17:36

## 2024-01-27 RX ADMIN — Medication 4000 UNITS: at 19:31

## 2024-01-27 RX ADMIN — CITALOPRAM HYDROBROMIDE 20 MG: 20 TABLET ORAL at 08:22

## 2024-01-27 RX ADMIN — LOSARTAN POTASSIUM 50 MG: 50 TABLET, FILM COATED ORAL at 08:22

## 2024-01-27 RX ADMIN — MIRTAZAPINE 15 MG: 15 TABLET, FILM COATED ORAL at 20:45

## 2024-01-27 NOTE — CONSULTS
Nutrition Consult Note    Consult Re: ONS. Full Nutrition Assessment completed 1/26 and ONS started at that time with pt's preference for [place] noted. Dietitian's recommendations 1/26 include:     Consider an appetite stimulant,  2.   ONS with all meals   3.   Consider alternate means of nutrition if PO remains poor and if appropriate with GOC/POC.     Will continue inpatient monitoring.     Electronically signed by Geetha Llamas RD, CNSC, LD on 1/27/24 at 8:53 AM EST    Contact: [phone]

## 2024-01-28 LAB
CA-I BLD-SCNC: 1.19 MMOL/L (ref 1.15–1.33)
MAGNESIUM SERPL-MCNC: 2 MG/DL (ref 1.6–2.6)
PHOSPHATE SERPL-MCNC: 4.3 MG/DL (ref 2.5–4.5)

## 2024-01-28 PROCEDURE — 6370000000 HC RX 637 (ALT 250 FOR IP): Performed by: INTERNAL MEDICINE

## 2024-01-28 PROCEDURE — 36415 COLL VENOUS BLD VENIPUNCTURE: CPT

## 2024-01-28 PROCEDURE — 1200000000 HC SEMI PRIVATE

## 2024-01-28 PROCEDURE — 6370000000 HC RX 637 (ALT 250 FOR IP): Performed by: STUDENT IN AN ORGANIZED HEALTH CARE EDUCATION/TRAINING PROGRAM

## 2024-01-28 PROCEDURE — 84100 ASSAY OF PHOSPHORUS: CPT

## 2024-01-28 PROCEDURE — 2580000003 HC RX 258

## 2024-01-28 PROCEDURE — 6360000002 HC RX W HCPCS

## 2024-01-28 PROCEDURE — 82330 ASSAY OF CALCIUM: CPT

## 2024-01-28 PROCEDURE — 83735 ASSAY OF MAGNESIUM: CPT

## 2024-01-28 PROCEDURE — 6370000000 HC RX 637 (ALT 250 FOR IP): Performed by: GENERAL PRACTICE

## 2024-01-28 RX ADMIN — POTASSIUM CHLORIDE 40 MEQ: 1500 TABLET, EXTENDED RELEASE ORAL at 17:58

## 2024-01-28 RX ADMIN — FERROUS SULFATE TAB 325 MG (65 MG ELEMENTAL FE) 325 MG: 325 (65 FE) TAB at 11:09

## 2024-01-28 RX ADMIN — LOSARTAN POTASSIUM 50 MG: 50 TABLET, FILM COATED ORAL at 11:09

## 2024-01-28 RX ADMIN — MIRTAZAPINE 15 MG: 15 TABLET, FILM COATED ORAL at 20:44

## 2024-01-28 RX ADMIN — POTASSIUM CHLORIDE 40 MEQ: 1500 TABLET, EXTENDED RELEASE ORAL at 11:09

## 2024-01-28 RX ADMIN — CITALOPRAM HYDROBROMIDE 20 MG: 20 TABLET ORAL at 11:09

## 2024-01-28 RX ADMIN — DONEPEZIL HYDROCHLORIDE 5 MG: 10 TABLET, FILM COATED ORAL at 20:44

## 2024-01-28 RX ADMIN — AMLODIPINE BESYLATE 5 MG: 5 TABLET ORAL at 11:09

## 2024-01-28 RX ADMIN — WATER 1000 MG: 1 INJECTION INTRAMUSCULAR; INTRAVENOUS; SUBCUTANEOUS at 17:58

## 2024-01-29 VITALS
HEIGHT: 63 IN | RESPIRATION RATE: 16 BRPM | WEIGHT: 107.8 LBS | SYSTOLIC BLOOD PRESSURE: 145 MMHG | TEMPERATURE: 99.2 F | HEART RATE: 73 BPM | DIASTOLIC BLOOD PRESSURE: 65 MMHG | OXYGEN SATURATION: 96 % | BODY MASS INDEX: 19.1 KG/M2

## 2024-01-29 LAB
ALBUMIN SERPL-MCNC: 3.4 G/DL (ref 3.5–5.2)
ALDOST SERPL-MCNC: <3 NG/DL
ALP SERPL-CCNC: 53 U/L (ref 35–104)
ALT SERPL-CCNC: 13 U/L (ref 0–32)
ANION GAP SERPL CALCULATED.3IONS-SCNC: 8 MMOL/L (ref 7–16)
AST SERPL-CCNC: 19 U/L (ref 0–31)
BILIRUB SERPL-MCNC: 0.3 MG/DL (ref 0–1.2)
BUN SERPL-MCNC: 8 MG/DL (ref 6–23)
CA-I BLD-SCNC: 1.21 MMOL/L (ref 1.15–1.33)
CALCIUM SERPL-MCNC: 9 MG/DL (ref 8.6–10.2)
CHLORIDE SERPL-SCNC: 105 MMOL/L (ref 98–107)
CO2 SERPL-SCNC: 26 MMOL/L (ref 22–29)
CREAT SERPL-MCNC: 0.7 MG/DL (ref 0.5–1)
ERYTHROCYTE [DISTWIDTH] IN BLOOD BY AUTOMATED COUNT: 14.1 % (ref 11.5–15)
GFR SERPL CREATININE-BSD FRML MDRD: >60 ML/MIN/1.73M2
GLUCOSE SERPL-MCNC: 84 MG/DL (ref 74–99)
HCT VFR BLD AUTO: 37.6 % (ref 34–48)
HGB BLD-MCNC: 12.3 G/DL (ref 11.5–15.5)
MAGNESIUM SERPL-MCNC: 2.2 MG/DL (ref 1.6–2.6)
MCH RBC QN AUTO: 31.1 PG (ref 26–35)
MCHC RBC AUTO-ENTMCNC: 32.7 G/DL (ref 32–34.5)
MCV RBC AUTO: 95.2 FL (ref 80–99.9)
PHOSPHATE SERPL-MCNC: 3.5 MG/DL (ref 2.5–4.5)
PLATELET # BLD AUTO: 378 K/UL (ref 130–450)
PMV BLD AUTO: 9.5 FL (ref 7–12)
POTASSIUM SERPL-SCNC: 4.6 MMOL/L (ref 3.5–5)
PROT SERPL-MCNC: 6.3 G/DL (ref 6.4–8.3)
RBC # BLD AUTO: 3.95 M/UL (ref 3.5–5.5)
SODIUM SERPL-SCNC: 139 MMOL/L (ref 132–146)
WBC OTHER # BLD: 9.3 K/UL (ref 4.5–11.5)

## 2024-01-29 PROCEDURE — 84100 ASSAY OF PHOSPHORUS: CPT

## 2024-01-29 PROCEDURE — 83735 ASSAY OF MAGNESIUM: CPT

## 2024-01-29 PROCEDURE — 6370000000 HC RX 637 (ALT 250 FOR IP): Performed by: STUDENT IN AN ORGANIZED HEALTH CARE EDUCATION/TRAINING PROGRAM

## 2024-01-29 PROCEDURE — 85027 COMPLETE CBC AUTOMATED: CPT

## 2024-01-29 PROCEDURE — 6360000002 HC RX W HCPCS

## 2024-01-29 PROCEDURE — 2580000003 HC RX 258

## 2024-01-29 PROCEDURE — 6370000000 HC RX 637 (ALT 250 FOR IP): Performed by: GENERAL PRACTICE

## 2024-01-29 PROCEDURE — 6370000000 HC RX 637 (ALT 250 FOR IP): Performed by: INTERNAL MEDICINE

## 2024-01-29 PROCEDURE — 80053 COMPREHEN METABOLIC PANEL: CPT

## 2024-01-29 PROCEDURE — 82330 ASSAY OF CALCIUM: CPT

## 2024-01-29 RX ORDER — FERROUS SULFATE 325(65) MG
325 TABLET ORAL
Qty: 30 TABLET | Refills: 3 | Status: SHIPPED | OUTPATIENT
Start: 2024-01-30

## 2024-01-29 RX ORDER — LOSARTAN POTASSIUM 50 MG/1
50 TABLET ORAL DAILY
Qty: 30 TABLET | Refills: 3 | Status: SHIPPED | OUTPATIENT
Start: 2024-01-30

## 2024-01-29 RX ORDER — DONEPEZIL HYDROCHLORIDE 5 MG/1
5 TABLET, FILM COATED ORAL NIGHTLY
Qty: 30 TABLET | Refills: 3 | Status: SHIPPED | OUTPATIENT
Start: 2024-01-29

## 2024-01-29 RX ORDER — POTASSIUM CHLORIDE 20 MEQ/1
40 TABLET, EXTENDED RELEASE ORAL DAILY
Qty: 60 TABLET | Refills: 1 | Status: SHIPPED | OUTPATIENT
Start: 2024-01-29

## 2024-01-29 RX ORDER — AMLODIPINE BESYLATE 5 MG/1
5 TABLET ORAL DAILY
Qty: 30 TABLET | Refills: 3 | Status: SHIPPED | OUTPATIENT
Start: 2024-01-30

## 2024-01-29 RX ORDER — CHOLECALCIFEROL (VITAMIN D3) 50 MCG
4000 TABLET ORAL WEEKLY
Qty: 30 TABLET | Refills: 2 | Status: SHIPPED | OUTPATIENT
Start: 2024-02-03

## 2024-01-29 RX ORDER — MIRTAZAPINE 15 MG/1
15 TABLET, FILM COATED ORAL NIGHTLY
Qty: 30 TABLET | Refills: 3 | Status: SHIPPED | OUTPATIENT
Start: 2024-01-29

## 2024-01-29 RX ADMIN — WATER 1000 MG: 1 INJECTION INTRAMUSCULAR; INTRAVENOUS; SUBCUTANEOUS at 17:01

## 2024-01-29 RX ADMIN — AMLODIPINE BESYLATE 5 MG: 5 TABLET ORAL at 08:37

## 2024-01-29 RX ADMIN — FERROUS SULFATE TAB 325 MG (65 MG ELEMENTAL FE) 325 MG: 325 (65 FE) TAB at 08:37

## 2024-01-29 RX ADMIN — POTASSIUM CHLORIDE 40 MEQ: 1500 TABLET, EXTENDED RELEASE ORAL at 17:00

## 2024-01-29 RX ADMIN — POTASSIUM CHLORIDE 40 MEQ: 1500 TABLET, EXTENDED RELEASE ORAL at 08:37

## 2024-01-29 RX ADMIN — CITALOPRAM HYDROBROMIDE 20 MG: 20 TABLET ORAL at 08:37

## 2024-01-29 RX ADMIN — LOSARTAN POTASSIUM 50 MG: 50 TABLET, FILM COATED ORAL at 08:37

## 2024-01-29 NOTE — DISCHARGE INSTR - COC
Continuity of Care Form    Patient Name: Babs Lewis   :  1945  MRN:  [id number]    Admit date:  2024  Discharge date:  24    Code Status Order: DNR-CCA   Advance Directives:     Admitting Physician:  Jesus Mckeon DO  PCP: Jesus Mckeon DO    Discharging Nurse: HIPOLITO  Discharging Hospital Unit/Room#: 0519/0519-A  Discharging Unit Phone Number: [phone]    Emergency Contact:   Extended Emergency Contact Information  Primary Emergency Contact: Pallai,Diane  Mobile Phone: [phone]  Relation: Brother/Sister  Secondary Emergency Contact: Mary Anne Dozier  Mobile Phone: [phone]  Relation: Child    Past Surgical History:  Past Surgical History:   Procedure Laterality Date    BREAST LUMPECTOMY         Immunization History:     There is no immunization history on file for this patient.    Active Problems:  Patient Active Problem List   Diagnosis Code    COVID-19 U07.1    Acute kidney failure (HCC) N17.9    Severe protein-calorie malnutrition (HCC) E43       Isolation/Infection:   Isolation            No Isolation          Patient Infection Status       Infection Onset Added Last Indicated Last Indicated By Review Planned Expiration Resolved Resolved By    None active    Resolved    C-diff (Clostridium difficile) 24 Clostridium Difficile Toxin/Antigen   24 Leyla Avalos    Negative result    COVID-19 23 COVID-19, Rapid   23 Infection                        Nurse Assessment:  Last Vital Signs: BP (!) 145/65   Pulse 73   Temp 99.2 °F (37.3 °C) (Oral)   Resp 16   Ht 1.6 m (5' 3\")   Wt 48.9 kg (107 lb 12.8 oz)   SpO2 96%   BMI 19.10 kg/m²     Last documented pain score (0-10 scale):    Last Weight:   Wt Readings from Last 1 Encounters:   24 48.9 kg (107 lb 12.8 oz)     Mental Status:  disoriented, follows commands    IV Access:  - None    Nursing Mobility/ADLs:  Walking   Independent  Transfer

## 2024-01-29 NOTE — PROGRESS NOTES
[facility] Quality Flow/Interdisciplinary Rounds Progress Note        Quality Flow Rounds held on January 26, 2024    Disciplines Attending:  Bedside Nurse, , , and Nursing Unit Leadership    Babs Lewis was admitted on 1/20/2024  2:17 PM    Anticipated Discharge Date:       Disposition:    Valentin Score:  Valentin Scale Score: 19    Readmission Risk              Risk of Unplanned Readmission:  13           Discussed patient goal for the day, patient clinical progression, and barriers to discharge.  The following Goal(s) of the Day/Commitment(s) have been identified:  Diagnostics - Report Results and Labs - Report Results      Angela Tian RN  January 26, 2024        
Nurse to nurse given to Renetta at [facility]. Electronically signed by Angela Tian RN on 1/29/2024 at 4:45 PM    
OCCUPATIONAL THERAPY INITIAL EVALUATION    [facility]   [address] [facility]        Date:2024                                                  Patient Name: Babs Lewis    MRN: [id number]    : 1945    Room: [address] [place] [address]     Evaluating OT: Ann-Marie Marshall OTR/L #[id number]    Referring Provider:  Jesus Mckeon DO     Specific Provider Orders/Date:  OT Eval and Treat , 2024     Diagnosis:   1. Acute kidney injury (HCC)    2. Urinary tract infection without hematuria, site unspecified    3. General weakness         Surgery: None       Pertinent Medical History: Dementia       Precautions:  Fall Risk, falls and alarm      Assessment of current deficits    [x] Functional mobility  [x]ADLs  [x] Strength               [x]Cognition    [x] Functional transfers   [x] IADLs         [x] Safety Awareness   [x]Endurance    [] Fine Coordination              [x] Balance      [] Vision/perception   []Sensation     []Gross Motor Coordination  [] ROM  [] Delirium                   [] Motor Control     OT PLAN OF CARE   OT POC based on physician orders, patient diagnosis and results of clinical assessment    Frequency/Duration 1-3 days/wk for 2 weeks PRN     Specific OT Treatment Interventions to include:   * Instruction/training on adapted ADL techniques and AE recommendations to increase functional independence within precautions       * Training on energy conservation strategies, correct breathing pattern and techniques to improve independence/tolerance for self-care routine  * Functional transfer/mobility training/DME recommendations for increased independence, safety, and fall prevention  * Patient/Family education to increase follow through with safety techniques and functional independence  * Recommendation of environmental modifications for increased safety with functional transfers/mobility and ADLs  * Cognitive retraining/development of 
Occupational Therapy  OT BEDSIDE TREATMENT NOTE      Date:2024  Patient Name: Babs Lewis  MRN: [id number]  : 1945  Room: [address] [place], [place] [address]     Evaluating OT: Ann-Marie Marshall OTR/L #[id number]     Referring Provider:  Jesus Mckeon DO      Specific Provider Orders/Date:  OT Eval and Treat , 2024      Diagnosis:   1. Acute kidney injury (HCC)    2. Urinary tract infection without hematuria, site unspecified    3. General weakness         Surgery: None        Pertinent Medical History: Dementia       Precautions:  Fall Risk, falls and alarm       Assessment of current deficits    [x] Functional mobility            [x]ADLs           [x] Strength                   [x]Cognition    [x] Functional transfers          [x] IADLs          [x] Safety Awareness   [x]Endurance    [] Fine Coordination              [x] Balance      [] Vision/perception    []Sensation      []Gross Motor Coordination  [] ROM           [] Delirium                   [] Motor Control      OT PLAN OF CARE   OT POC based on physician orders, patient diagnosis and results of clinical assessment     Frequency/Duration 1-3 days/wk for 2 weeks PRN      Specific OT Treatment Interventions to include:   * Instruction/training on adapted ADL techniques and AE recommendations to increase functional independence within precautions       * Training on energy conservation strategies, correct breathing pattern and techniques to improve independence/tolerance for self-care routine  * Functional transfer/mobility training/DME recommendations for increased independence, safety, and fall prevention  * Patient/Family education to increase follow through with safety techniques and functional independence  * Recommendation of environmental modifications for increased safety with functional transfers/mobility and ADLs  * Cognitive retraining/development of therapeutic activities to improve problem solving, judgement, memory, and attention for increased 
Patient resistant with potassium replacement. Sodium of. Check a few studies for yperaldosteronism  
Patient seen doing okay. Eating and drinking. Not falling. Bp running a bit high. Will start amlodipine, also start aricept for dementia.   
Patient seen potassium improved. Still low. Ultrasound negative. Labs pending. Likely stable for dc soon  
Patient seen vss afebrile. Tolerating meds. . Potassium ok. Bun improved. Needs high protein diet  
Physical Therapy  Facility/Department: [address] MED SURG  Physical Therapy Initial Assessment    Name: Babs Lewis  : 1945  MRN: [id number]  Date of Service: 2024    Attending Provider:  Jesus Mckeon DO    Evaluating PT:  Jeremy Campuzano Jr., P.T.    Room #:  19/0519-A  Diagnosis:  Acute kidney failure (HCC) [N17.9]  General weakness [R53.1]  Acute kidney injury (HCC) [N17.9]  Urinary tract infection without hematuria, site unspecified [N39.0]  Pertinent PMHx/PSHx:  dementia  Precautions:  falls, bed/chair alarm if someone is not with pt    SUBJECTIVE:    Pt lives at [facility] and ambulated with no AD PTA.    OBJECTIVE:   Initial Evaluation  Date: 24 Treatment  2024 Short Term/ Long Term   Goals   Was pt agreeable to Eval/treatment? yes yes    Does pt have pain? No c/o pain or apparent pain No complaints    Bed Mobility  Rolling: supervision  Supine to sit: supervision  Sit to supine: supervision  Scooting: supervision  Supine to sit: Supervision  Scooting: Supervision Independent    Transfers Sit to stand: SBA  Stand to sit: SBA  Stand pivot: SBA/CGA Sit <> stand: SBA supervision   Ambulation   15+200 feet with no AD SBA/ without A/D  feet with no AD supervision   Stair negotiation: ascended and descended NA NT NA   AM-PAC 6 Clicks       Pt is alert, confused at times but following instruction  Balance: fair minus dynamic without A/D    Pt performed therapeutic exercise of the following: NT    Patient education/treatment  Pt was educated on slow steady gait    Patient response to education:   Pt verbalized understanding Pt demonstrated skill Pt requires further education in this area   yes With instruction yes     ASSESSMENT:   Comments: Pt found in bed, sat EOB SBA. Gait slow and consistent. Pt with mild unsteadiness, required light hands on assist for balance/safety.    Pt was left in a bedside chair with call light in reach.    Chair/bed alarm: chair 
Progress Note  1/28/2024 12:21 PM  Subjective:   Admit Date: 1/20/2024  PCP: Jesus Mckeon, DO  Interval History: patient examined doing well feels ok , eating her lunch     Diet: ADULT DIET; Regular  ADULT ORAL NUTRITION SUPPLEMENT; Breakfast, Lunch, Dinner; Standard 4 oz Oral Supplement    Data:   Scheduled Meds:   mirtazapine  15 mg Oral Nightly    ferrous sulfate  325 mg Oral Daily with breakfast    vitamin D  4,000 Units Oral Weekly    losartan  50 mg Oral Daily    amLODIPine  5 mg Oral Daily    donepezil  5 mg Oral Nightly    potassium chloride  40 mEq Oral BID WC    cefTRIAXone (ROCEPHIN) IV  1,000 mg IntraVENous Q24H    citalopram  20 mg Oral Daily     Continuous Infusions:  PRN Meds:white petrolatum, ondansetron, acetaminophen  I/O last 3 completed shifts:  In: 300 [P.O.:300]  Out: -   No intake/output data recorded.    Intake/Output Summary (Last 24 hours) at 1/28/2024 1221  Last data filed at 1/28/2024 0900  Gross per 24 hour   Intake 180 ml   Output --   Net 180 ml     CBC:   Recent Labs     01/26/24  0905 01/27/24  0320   WBC 7.3 9.1   HGB 11.0* 11.9    340     BMP:    Recent Labs     01/25/24  1432 01/26/24  0905 01/27/24  0320    139 138   K 3.3* 3.0* 3.7    101 103   CO2 25 27 25   BUN 2* <1* 3*   CREATININE 0.5 0.5 0.6   GLUCOSE 116* 95 93     Hepatic:   Recent Labs     01/26/24  0905 01/27/24  0320   AST 10 12   ALT 7 9   BILITOT 0.4 0.5   ALKPHOS 44 52     Troponin: No results for input(s): \"TROPONINI\" in the last 72 hours.  BNP: No results for input(s): \"BNP\" in the last 72 hours.  Lipids: No results for input(s): \"CHOL\", \"HDL\" in the last 72 hours.    Invalid input(s): \"LDLCALCU\"  ABGs: No results found for: \"PHART\", \"PO2ART\", \"QVG0IJP\"  INR: No results for input(s): \"INR\" in the last 72 hours.    -----------------------------------------------------------------  RAD: CT CSpine W/O Contrast    Result Date: 1/20/2024  EXAMINATION: CT OF THE CERVICAL SPINE WITHOUT CONTRAST 
Progress Note  1/29/2024 10:40 AM  Subjective:   Admit Date: 1/20/2024  PCP: Jesus Mckeon, DO  Interval History: patient being seen for BETHANY and HTN    1/29/24: Patient was seen and examined. She is resting comfortably in the chair, daughter at the bedside. She reports feeling good today. She denies any pain. She denies shortness of breath. She denies n/v. She reports a good appetite. She is to be discharged back to her nursing facility later today.     Diet: ADULT DIET; Regular  ADULT ORAL NUTRITION SUPPLEMENT; Breakfast, Lunch, Dinner; Standard 4 oz Oral Supplement  ADULT ORAL NUTRITION SUPPLEMENT; Breakfast, Lunch, Dinner; Standard High Calorie/High Protein Oral Supplement    Data:   Scheduled Meds:   mirtazapine  15 mg Oral Nightly    ferrous sulfate  325 mg Oral Daily with breakfast    vitamin D  4,000 Units Oral Weekly    losartan  50 mg Oral Daily    amLODIPine  5 mg Oral Daily    donepezil  5 mg Oral Nightly    potassium chloride  40 mEq Oral BID WC    cefTRIAXone (ROCEPHIN) IV  1,000 mg IntraVENous Q24H    citalopram  20 mg Oral Daily     Continuous Infusions:  PRN Meds:white petrolatum, ondansetron, acetaminophen  I/O last 3 completed shifts:  In: 840 [P.O.:840]  Out: -   No intake/output data recorded.    Intake/Output Summary (Last 24 hours) at 1/29/2024 0947  Last data filed at 1/28/2024 1801  Gross per 24 hour   Intake 840 ml   Output --   Net 840 ml       CBC:   Recent Labs     01/27/24  0320 01/29/24  0505   WBC 9.1 9.3   HGB 11.9 12.3    378       BMP:    Recent Labs     01/27/24  0320 01/29/24  0505    139   K 3.7 4.6    105   CO2 25 26   BUN 3* 8   CREATININE 0.6 0.7   GLUCOSE 93 84       Hepatic:   Recent Labs     01/27/24  0320 01/29/24  0505   AST 12 19   ALT 9 13   BILITOT 0.5 0.3   ALKPHOS 52 53       Troponin: No results for input(s): \"TROPONINI\" in the last 72 hours.  BNP: No results for input(s): \"BNP\" in the last 72 hours.  Lipids: No results for input(s): \"CHOL\", 
5:07 pm TECHNIQUE: CT of the cervical spine was performed without the administration of intravenous contrast. Multiplanar reformatted images are provided for review. Automated exposure control, iterative reconstruction, and/or weight based adjustment of the mA/kV was utilized to reduce the radiation dose to as low as reasonably achievable. COMPARISON: 01/12/2024 HISTORY: ORDERING SYSTEM PROVIDED HISTORY: fall TECHNOLOGIST PROVIDED HISTORY: Reason for exam:->fall Decision Support Exception - unselect if not a suspected or confirmed emergency medical condition->Emergency Medical Condition (MA) FINDINGS: BONES/ALIGNMENT: There is no acute fracture or traumatic malalignment. DEGENERATIVE CHANGES: Again noted are subtle erosive changes of the odontoid process.  This can be associated with rheumatoid arthritis.  No fractures are observed. There are signs of disc height loss with posterior endplate osteophytes and hypertrophy of the uncovertebral joints at the C3-C4, C4-C5 C5-C6 and C6-C7 levels.  There are no signs of significant foramina and narrowing however. Within the cervical facet joints on the right there are hypertrophic changes with osteophytic change at the C2-C3, C3-C4, C4-C5, C6-C7 and C7-T1 levels. On the left, there are signs of osteophytic change along the facet joints at the T1-T2 level. SOFT TISSUES: There is no prevertebral soft tissue swelling.     1.  No acute abnormality of the cervical spine. 2.  Signs of multilevel degenerative disc disease, most significant at the C5-C6, C6-C7 levels 3.  Advanced cervical facet arthropathy on the right at the C2-C3 through C5-C6 region     CT Head W/O Contrast    Result Date: 1/20/2024  EXAMINATION: CT OF THE HEAD WITHOUT CONTRAST  1/20/2024 5:07 pm TECHNIQUE: CT of the head was performed without the administration of intravenous contrast. Automated exposure control, iterative reconstruction, and/or weight based adjustment of the mA/kV was utilized to reduce the 
that she is eating well.      Problem List:    Patient Active Problem List   Diagnosis    COVID-19    Acute kidney failure (HCC)        Past Medical History:    Past Medical History:   Diagnosis Date    Mastitis in female        MEDS (scheduled):   potassium chloride  10 mEq IntraVENous Q1H    amLODIPine  5 mg Oral Daily    donepezil  5 mg Oral Nightly    potassium chloride  40 mEq Oral BID WC    cefTRIAXone (ROCEPHIN) IV  1,000 mg IntraVENous Q24H    citalopram  20 mg Oral Daily       MEDS (infusions):      MEDS (prn):  white petrolatum, ondansetron, acetaminophen    Diet:    ADULT DIET; Regular      Physical Exam:      Patient Vitals for the past 24 hrs:   BP Temp Temp src Pulse Resp SpO2 Weight   01/26/24 0721 (!) 182/72 98.1 °F (36.7 °C) Oral 68 16 98 % --   01/26/24 0420 -- -- -- -- -- -- 50.2 kg (110 lb 10.7 oz)   01/25/24 1958 (!) 177/74 98.3 °F (36.8 °C) Oral 68 16 94 % --          Intake/Output Summary (Last 24 hours) at 1/26/2024 1107  Last data filed at 1/26/2024 1057  Gross per 24 hour   Intake 0 ml   Output --   Net 0 ml       Wt Readings from Last 3 Encounters:   01/26/24 50.2 kg (110 lb 10.7 oz)   01/12/24 54.4 kg (120 lb)   03/29/23 54.4 kg (120 lb)       Constitutional:  Awake, alert, no acute distress  Neck: No JVD noted  Cardiovascular: Regular rate and rhythm, no murmurs, gallops, or rubs  Respiratory:  Clear, no rales, rhonchi, or wheezes  Gastrointestinal: soft, nontender, nondistended  Ext: No edema  Neuro: Awake, answers questions appropriately  Skin: Dry and warm, no rash      Data:      Recent Labs     01/24/24  0735 01/25/24  0705 01/26/24  0905   WBC 7.0 7.3 7.3   HGB 10.0* 10.2* 11.0*   HCT 29.2* 29.4* 31.2*   MCV 90.1 90.2 89.1    187 252     Recent Labs     01/25/24  0705 01/25/24  1432 01/26/24  0905    136 139   K 2.8* 3.3* 3.0*    100 101   CO2 24 25 27   BUN <1* 2* <1*   CREATININE 0.5 0.5 0.5   LABGLOM >60 >60 >60   GLUCOSE 84 116* 95   CALCIUM 8.0* 8.1* 8.5* 
appropriate assistive device  [] Stair Training in preparation for safe discharge home and/or into the community   [] Positioning to prevent skin breakdown and contractures  [x] Safety and Education Training   [] Patient/Caregiver Education   [] HEP  [] Other     PT long term treatment goals are located in above grid    Frequency of treatments: 2-5x/week x 1-2 weeks.    Time in  07:50  Time out  08:10    Total Treatment Time 10 minutes     Evaluation Time includes thorough review of current medical information, gathering information on past medical history/social history and prior level of function, completion of standardized testing/informal observation of tasks, assessment of data and education on plan of care and goals.    CPT codes:  [x] Low Complexity PT evaluation 99842  [] Moderate Complexity PT evaluation 65995  [] High Complexity PT evaluation 12542  [] PT Re-evaluation 88359  [] Gait training 11662 ** minutes  [] Manual therapy 86055 ** minutes  [x] Therapeutic activities 36925 10 minutes  [] Therapeutic exercises 98039 ** minutes  [] Neuromuscular reeducation 42436 ** minutes     Jeremy Campuzano Jr., P.T.  License Number: [id number]

## 2024-01-29 NOTE — CARE COORDINATION
Updated plan of care. Patient discharge plan is back to [facility] with [facility].  Per IDR, plan is to discharge today. [facility] notified of discharge. HHC order placed. [facility] called, spoke to Farida, notified of discharge today. Gerber Low is able to transport home today. Spoke to gerber Low and answered all discharge questions.   Electronically signed by Gracia Bassett RN on 1/29/2024 at 10:26 AM

## 2024-01-29 NOTE — PATIENT CARE CONFERENCE
P Quality Flow/Interdisciplinary Rounds Progress Note        Quality Flow Rounds held on January 29, 2024    Disciplines Attending:  Bedside Nurse, , , and Nursing Unit Leadership    Babs Lewis was admitted on 1/20/2024  2:17 PM    Anticipated Discharge Date:       Disposition:    Valentin Score:  Valentin Scale Score: 19    Readmission Risk              Risk of Unplanned Readmission:  11           Discussed patient goal for the day, patient clinical progression, and barriers to discharge.  The following Goal(s) of the Day/Commitment(s) have been identified:  Discharge - Obtain Order      Lolis Munroe RN  January 29, 2024

## 2024-01-30 LAB — RENIN PLAS-CCNC: 0.2 NG/ML/HR

## 2024-10-11 PROBLEM — R41.82 ALTERED MENTAL STATUS: Status: ACTIVE | Noted: 2024-10-11

## 2024-10-11 NOTE — ED PROVIDER NOTES
[facility] EMERGENCY DEPARTMENT  EMERGENCY DEPARTMENT ENCOUNTER        Pt Name: Babs Lewis  MRN: [id number]  Birthdate 1945  Date of evaluation: 10/11/2024  Provider: Alvino Dewitt DO  PCP: Jesus Mckeon DO  Note Started: 1:47 PM EDT 10/11/24    CHIEF COMPLAINT       Chief Complaint   Patient presents with    Fall     Pt from a dementia unit, fell 2-3 days ago. Son showed up and told them to call EMS, pt presents with raccoon eyes bruising, baseline A/O x1, hx hypertension. (-) N/V       HISTORY OF PRESENT ILLNESS: 1 or more Elements   History From: EMS    Limitations to history : Dementia    Babs Lewis is a 79 y.o. female who presents to the emergency department for fall couple days ago.  Patient has a history of end-stage dementia and had a fall at the nursing home 2 days ago.  Patient had bruising to her face.  Son is at bedside and reports that the patient was pleasantly demented after being on her psychiatric medications for months and then over the past week or 2 the patient has progressively declined prior to the fall.  He wishes to have the patient checked out for sources of infection.  Limited HPI secondary to patient's history of advanced dementia    Nursing Notes were all reviewed and agreed with or any disagreements were addressed in the HPI.        REVIEW OF SYSTEMS :      Limited ROS secondary to patient's history of dementia    SURGICAL HISTORY     Past Surgical History:   Procedure Laterality Date    BREAST LUMPECTOMY  1993       CURRENTMEDICATIONS       Previous Medications    AMLODIPINE (NORVASC) 5 MG TABLET    Take 1 tablet by mouth daily    CITALOPRAM (CELEXA) 20 MG TABLET    Take 1 tablet by mouth daily    DONEPEZIL (ARICEPT) 5 MG TABLET    Take 1 tablet by mouth nightly    FERROUS SULFATE (IRON 325) 325 (65 FE) MG TABLET    Take 1 tablet by mouth daily (with breakfast)    LOSARTAN (COZAAR) 50 MG TABLET    Take 1 tablet by mouth

## 2024-10-12 PROBLEM — S09.90XA HEAD INJURY: Status: ACTIVE | Noted: 2024-10-12
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
## 2024-10-12 NOTE — H&P
Hospital Medicine History & Physical      PCP: Jesus Mckeon DO    Date of Admission: 10/11/2024    Date of Service: Pt seen/examined on 10/12/2024 and Placed in Observation.    Chief Complaint: Altered mental status      History Of Present Illness:      79 y.o. female who presented to [facility] with altered mental status has been going on for the last 2 days after a fall at the nursing home.  Suffered some facial bruising.  Son at bedside mentioned that at baseline she is pleasantly demented but over the last 2 days she has progressively gotten more confused and decline prior to her fall.  CT head, CT facial bones without contrast, CT cervical spine was obtained in the ER which showed scalp hematoma overlying the left frontal bone improved to soft tissue mass with and right periorbital edema.  See cervical spine with no fracture.  Urinalysis with1+ bacteria but no WBC nitrites or leukocyte Estrace.  Chest x-ray with no acute findings. Patient is barely oriented to self only.    Past Medical History:          Diagnosis Date    Mastitis in female        Past Surgical History:          Procedure Laterality Date    BREAST LUMPECTOMY  1993       Medications Prior to Admission:      Prior to Admission medications    Medication Sig Start Date End Date Taking? Authorizing Provider   potassium chloride (KLOR-CON M) 20 MEQ extended release tablet Take 2 tablets by mouth daily 1/29/24   Gracia Leonard, APRN - CNP   mirtazapine (REMERON) 15 MG tablet Take 1 tablet by mouth nightly 1/29/24   Jesus Mckeon DO   losartan (COZAAR) 50 MG tablet Take 1 tablet by mouth daily 1/30/24   Jesus Mckeon DO   amLODIPine (NORVASC) 5 MG tablet Take 1 tablet by mouth daily 1/30/24   Jesus Mckeon DO   ferrous sulfate (IRON 325) 325 (65 Fe) MG tablet Take 1 tablet by mouth daily (with breakfast) 1/30/24   Jesus Mckeon DO   donepezil (ARICEPT) 5 MG tablet Take 1 tablet by mouth nightly 1/29/24   Jesus Mckeon DO   I have reviewed the CXR with the following interpretation: No acute process  EKG:  I have reviewed the EKG with the following interpretation: Sinus tach    Labs:     Recent Labs     10/11/24  1604   WBC 13.5*   HGB 12.2   HCT 36.3        Recent Labs     10/11/24  1604      K 4.4      CO2 22   BUN 20   CREATININE 1.0   CALCIUM 10.2     Recent Labs     10/11/24  1604   AST 28   ALT 16   BILITOT 0.6   ALKPHOS 71     No results for input(s): \"INR\" in the last 72 hours.  No results for input(s): \"CKTOTAL\", \"TROPONINI\" in the last 72 hours.    Urinalysis:      Lab Results   Component Value Date/Time    NITRU NEGATIVE 10/11/2024 04:04 PM    WBCUA 0 TO 5 10/11/2024 04:04 PM    WBCUA 2-4 03/23/2024 04:50 PM    BACTERIA 2+ 10/11/2024 04:04 PM    RBCUA 0 TO 2 10/11/2024 04:04 PM    BLOODU Negative 03/29/2023 11:31 AM    GLUCOSEU NEGATIVE 10/11/2024 04:04 PM         ASSESSMENT:  Altered mental status  Fall  Scalp hematoma overlying the left frontal bone improved to soft tissue mass with  Right periorbital edema  Dementia      PLAN:  Given the worsening confusion despite infectious workup obtain MRI of the brain given her recent fall.  Fall precaution.  PT OT.  Hold chemical DVT prophylaxis for now SCDs for now until MRI brain results.         Mj Covarrubias MD    Thank you Jesus Mckeon DO for the opportunity to be involved in this patient's care. If you have any questions or concerns please feel free to contact me through [employer].

## 2024-10-12 NOTE — PLAN OF CARE
The patient is an 79-year-old old female with past medical history of  dementia who came from a nursing home for severe progressive confusion and altered mental status.  Son was at bedside providing history.  Steadily declining over the past few weeks but has become worse over the past few days and very acute progression per son. Patient noted to have hip flexion and refusing to lie down which is new for her. CT head negative for acute intracranial abnormalities apart from periorbital hematoma. MRI pending. Will order Ativan for MRI. No Fevers, with slightly elevated WBC. UA negative for pyuria. Await MRI, may need LP, although this would be challenging. She has been refusing to lay down and continues hip and knee flexion. Will get ID opinion.

## 2024-10-12 NOTE — ED NOTES
Updated report given to receiving nurse on 8S. Patient to be placed in transport when room is clean.

## 2024-10-13 NOTE — PLAN OF CARE
Problem: Safety - Adult  Goal: Free from fall injury  10/13/2024 0533 by Dania Roberto RN  Outcome: Progressing  10/12/2024 1627 by Laurie Sarmiento RN  Outcome: Progressing     Problem: ABCDS Injury Assessment  Goal: Absence of physical injury  10/13/2024 0533 by Dania Roberto RN  Outcome: Progressing  10/12/2024 1627 by Laurie Sarmiento RN  Outcome: Progressing     Problem: Skin/Tissue Integrity  Goal: Absence of new skin breakdown  Description: 1.  Monitor for areas of redness and/or skin breakdown  2.  Assess vascular access sites hourly  3.  Every 4-6 hours minimum:  Change oxygen saturation probe site  4.  Every 4-6 hours:  If on nasal continuous positive airway pressure, respiratory therapy assess nares and determine need for appliance change or resting period.  10/13/2024 0533 by Dania Roberto RN  Outcome: Progressing  10/12/2024 1627 by Laurie Sarmiento RN  Outcome: Progressing

## 2024-10-13 NOTE — PROGRESS NOTES
[facility] Hospitalist Progress Note    Admitting Date and Time: 10/11/2024  1:15 PM  Admit Dx: Altered mental status [R41.82]  Injury of head, initial encounter [S09.90XA]  Fall, initial encounter [W19.XXXA]  History of dementia [Z86.59]    Synopsis: The patient is an 79-year-old old female with past medical history of  dementia who came from a nursing home for severe progressive confusion and altered mental status.  Son was at bedside providing history.  Steadily declining over the past few weeks but has become worse over the past few days and very acute progression per son. Patient noted to have hip flexion and refusing to lie down which is new for her. CT head negative for acute intracranial abnormalities apart from periorbital hematoma. MRI pending.  No Fevers, with slightly elevated WBC. UA negative for pyuria. Await MRI. ID consulted for Sepsis, concern for meningitis.    Subjective:  Patient seen and examined today. More calm   Able to eat half of her oatmeal per the sitter.   Continues to refuse to lie down     ROS: denies fever, chills, cp, sob, n/v, HA unless stated above.      sodium chloride flush  5-40 mL IntraVENous 2 times per day    melatonin  3 mg Oral Nightly    ziprasidone  10 mg IntraMUSCular Once    lithium  300 mg Oral BID WC    memantine  10 mg Oral BID    rivastigmine  1 patch TransDERmal Daily    vitamin D  4,000 Units Oral Daily    cefTRIAXone (ROCEPHIN) IV  1,000 mg IntraVENous Q24H     sodium chloride flush, 5-40 mL, PRN  sodium chloride, , PRN  potassium chloride, 40 mEq, PRN   Or  potassium alternative oral replacement, 40 mEq, PRN   Or  potassium chloride, 10 mEq, PRN  magnesium sulfate, 2,000 mg, PRN  ondansetron, 4 mg, Q8H PRN   Or  ondansetron, 4 mg, Q6H PRN  polyethylene glycol, 17 g, Daily PRN  acetaminophen, 650 mg, Q6H PRN   Or  acetaminophen, 650 mg, Q6H PRN  haloperidol lactate, 1 mg, Q6H PRN  LORazepam, 1 mg, Once PRN         Objective:    BP (!) 154/103   Pulse  (!) 120   Temp 97 °F (36.1 °C) (Temporal)   Resp 18   SpO2 95%     General Appearance: alert and oriented to person, place and time and in no acute distress  Skin: warm and dry  Head: normocephalic and bilateral periorbital hematoma   Eyes: pupils equal, round, and reactive to light, extraocular eye movements intact, conjunctivae normal  Neck: neck supple and non tender without mass   Pulmonary/Chest: clear to auscultation bilaterally- no wheezes, rales or rhonchi, normal air movement, no respiratory distress  Cardiovascular: normal rate, normal S1 and S2 and no carotid bruits  Abdomen: soft, non-tender, non-distended, normal bowel sounds, no masses or organomegaly  Extremities: no cyanosis, no clubbing and no edema  Neurologic: no cranial nerve deficit and speech normal        Recent Labs     10/11/24  1604 10/13/24  0442    139   K 4.4 4.2    104   CO2 22 24   BUN 20 16   CREATININE 1.0 0.8   GLUCOSE 112* 122*   CALCIUM 10.2 9.9       Recent Labs     10/11/24  1604 10/13/24  0442   WBC 13.5* 12.7*   RBC 3.89 3.96   HGB 12.2 12.2   HCT 36.3 37.0   MCV 93.3 93.4   MCH 31.4 30.8   MCHC 33.6 33.0   RDW 13.1 13.1    296   MPV 9.4 9.8       Radiology:   XR CHEST PORTABLE   Final Result   No evidence of acute cardiopulmonary process.         CT Head W/O Contrast   Final Result   1. No acute intracranial abnormality.   2. Generalized atrophy and chronic small vessel ischemic change.   3. Scalp hematoma overlying the left frontal bone and periorbital soft tissue   swelling.         CT CSpine W/O Contrast   Final Result   1. No acute fracture or traumatic malalignment of the cervical spine.   2. Multilevel degenerative changes seen within the cervical spine.         CT FACIAL BONES WO CONTRAST   Final Result   1. Right periorbital and left frontal subcutaneous swelling/hematoma. No   underlying calvarial or orbital fracture detected.   2. Periapical lucencies involving the right and left maxillary 2nd

## 2024-10-13 NOTE — CONSULTS
Department of Internal Medicine  Infectious Diseases   Consult Note      Reason for Consult:  Mental status change , ? Meningitis       Requesting Physician:  Dr Itz Mendoza         HISTORY OF PRESENT ILLNESS:      This is a 79 yrs old female , nursing home resident , presented to the ER after she fell - she developed facial bruise . She does not talk or communicate at this moment . There had been no fever, or chills  WBC was 13.5 K   Lactic acid was  2.3  CT scan of head - .right periorbital and left frontal subcutaneous swelling/hematoma. No underlying calvarial or orbital fracture detected.   Rocephin started     Past Medical History:      Past Medical History:   Diagnosis Date    Mastitis in female          Past Surgical History:      Past Surgical History:   Procedure Laterality Date    BREAST LUMPECTOMY  1993         Current Medications:      Current Facility-Administered Medications   Medication Dose Route Frequency Provider Last Rate Last Admin    0.9 % sodium chloride infusion   IntraVENous Continuous aKmille Chavarria MD 75 mL/hr at 10/13/24 0925 New Bag at 10/13/24 0925    sodium chloride flush 0.9 % injection 5-40 mL  5-40 mL IntraVENous 2 times per day Mj Covarrubias MD   10 mL at 10/13/24 0925    sodium chloride flush 0.9 % injection 5-40 mL  5-40 mL IntraVENous PRN Mj Covarrubias MD   10 mL at 10/13/24 1111    0.9 % sodium chloride infusion   IntraVENous PRN Mj Covarrubias MD        potassium chloride (KLOR-CON M) extended release tablet 40 mEq  40 mEq Oral PRN Mj Covarrubias MD        Or    potassium bicarb-citric acid (EFFER-K) effervescent tablet 40 mEq  40 mEq Oral PRN Mj Covarrubias MD        Or    potassium chloride 10 mEq/100 mL IVPB (Peripheral Line)  10 mEq IntraVENous PRN Mj Covarrubias MD        magnesium sulfate 2000 mg in 50 mL IVPB premix  2,000 mg IntraVENous PRN Mj Covarrubias MD        ondansetron (ZOFRAN-ODT)  04:04 PM    MUCUS 1+ 03/08/2024 03:13 PM    BACTERIA 2+ 10/11/2024 04:04 PM    CLARITYU Clear 03/23/2024 04:50 PM    LEUKOCYTESUR NEGATIVE 10/11/2024 04:04 PM    UROBILINOGEN 0.2 10/11/2024 04:04 PM    BILIRUBINUR NEGATIVE 10/11/2024 04:04 PM    BLOODU Negative 03/29/2023 11:31 AM    GLUCOSEU NEGATIVE 10/11/2024 04:04 PM       ABG:  No results found for: \"JCO5RGP\", \"BEART\", \"U3XOEGER\", \"PHART\", \"THGBART\", \"YCA8AIB\", \"PO2ART\", \"JRY2TOU\"    MICROBIOLOGY:        Radiology :    CT scan of head -    Right periorbital and left frontal subcutaneous swelling/hematoma. No   underlying calvarial or orbital fracture detected.     IMPRESSION:     Fall,  periorbital, sub cutaneous hematoma   Leukocytosis     RECOMMENDATIONS:     Stop rocephin   MRI of brain   LP if MRI unremarkable     Thank you Dr Itz Mendoza for the consult

## 2024-10-14 PROBLEM — G93.40 ACUTE ENCEPHALOPATHY: Status: ACTIVE | Noted: 2024-10-14

## 2024-10-14 NOTE — PROGRESS NOTES
Physical Therapy  Initial Assessment     Name: Babs Lewis  : 1945  MRN: [id number]      Date of Service: 10/14/2024    Evaluating PT: Rafael Barahona, PT, DPT [id number]      Room #:  8202/8202-A  Diagnosis:  Altered mental status [R41.82]  Injury of head, initial encounter [S09.90XA]  Fall, initial encounter [W19.XXXA]  History of dementia [Z86.59]  Acute encephalopathy [G93.40]  PMHx/PSHx:   has a past medical history of Mastitis in female.  Precautions:  Fall risk, Dementia, PureWick, , Alarm    SUBJECTIVE:    Pt is a poor historian. Per daughter, pt lives at a memory care unit. Pt has recently declined functionally and is WC bound. Pt ambulates only short distances with assist from staff.     OBJECTIVE:   Initial Evaluation  Date: 10/14/24 Treatment Date: Short Term/ Long Term   Goals   AM-PAC 6 Clicks      Was pt agreeable to Eval/treatment? Yes     Does pt have pain? No complaints/indications of pain     Bed Mobility  Rolling: NT  Supine to sit: MaxA  Sit to supine: MaxA  Scooting: MaxA to EOB  Rolling: SBA  Supine to sit: SBA  Sit to supine: SBA  Scooting: SBA   Transfers Sit to stand: MaxA  Stand to sit: MaxA  Stand pivot: NT  Sit to stand: Violet  Stand to sit: Violet  Stand pivot: Violet with WW   Ambulation   Unable to take steps  >25 feet with WW with ModA   Stair negotiation: ascended and descended NT  NA   ROM BUE: Refer to OT note  BLE: WFL     Strength BUE: Refer to OT note  BLE: NT     Balance Sitting EOB: ModA  Dynamic Standing: MaxA  Sitting EOB: SBA  Dynamic Standing: Violet with WW     Pt is A & O x: 1 to person.  Sensation: NT  Edema: NT    Patient education  Pt educated on PT role in acute care setting.    Patient response to education:   Pt verbalized understanding Pt demonstrated skill Pt requires further education in this area   Yes NA No     ASSESSMENT:    Conditions Requiring Skilled Therapeutic Intervention:    [x]Decreased strength     []Decreased

## 2024-10-14 NOTE — PROGRESS NOTES
..Lighting rounds was done on pt . Educated pt on fall risk. Provided patient with yellow gripper socks and fall risk wristband.

## 2024-10-14 NOTE — BRIEF OP NOTE
Brief-Op Note  ______________________________________________________________      IR/FL  LUMBAR PUNCTURE  SEY[facility] 8S CDU    Patient Name: Babs Lewis   YOB: 1945  Medical Record Number: [id number]  Date of Procedure: 10/14/24  Room/Bed: [address] [place] [address]    Preoperative diagnosis: Altered Mental Status and Possible Meningitis    Postoperative diagnosis: Same    CONSENT:  Informed consent was obtained from the daughter via phone verified by 2 additional witnesses prior to the procedure.     Procedure: Fluoroscopic-guided lumbar puncture    Anesthesia: Local anesthesia administered subcutaneously.    Performed by: SANDRA Hartmann under on-site supervision by Luana Umana MD.    Estimated blood loss: None    Specimen Obtained: 4 vials of CSF (see lab order sheet for fluid details)    Complications: None.    Implants: None    Electronically signed by SANDRA Hartmann   DD: 10/14/24  1:05 PM

## 2024-10-14 NOTE — PLAN OF CARE
Problem: Safety - Adult  Goal: Free from fall injury  10/14/2024 1539 by Katelyn Nieto, RN  Outcome: Progressing       Problem: ABCDS Injury Assessment  Goal: Absence of physical injury  10/14/2024 1539 by Katelyn Nieto, RN  Outcome: Progressing       Problem: Skin/Tissue Integrity  Goal: Absence of new skin breakdown  Description: 1.  Monitor for areas of redness and/or skin breakdown  2.  Assess vascular access sites hourly  3.  Every 4-6 hours minimum:  Change oxygen saturation probe site  4.  Every 4-6 hours:  If on nasal continuous positive airway pressure, respiratory therapy assess nares and determine need for appliance change or resting period.  10/14/2024 1539 by Katelyn Nieto, RN  Outcome: Progressing

## 2024-10-14 NOTE — PROGRESS NOTES
[facility] Hospitalist Progress Note    Admitting Date and Time: 10/11/2024  1:15 PM  Admit Dx: Altered mental status [R41.82]  Injury of head, initial encounter [S09.90XA]  Fall, initial encounter [W19.XXXA]  History of dementia [Z86.59]    Synopsis: The patient is an 79-year-old old female with past medical history of  dementia who came from a nursing home for severe progressive confusion and altered mental status.  Son was at bedside providing history.  Steadily declining over the past few weeks but has become worse over the past few days and very acute progression per son. Patient noted to have hip flexion and refusing to lie down which is new for her. CT head negative for acute intracranial abnormalities apart from periorbital hematoma. MRI pending.  No Fevers, with slightly elevated WBC. UA negative for pyuria. Await MRI. ID consulted for Sepsis, concern for meningitis.    Subjective:  Patient seen and examined today. More calm   Working with PT today.     ROS: denies fever, chills, cp, sob, n/v, HA unless stated above.      sodium chloride flush  5-40 mL IntraVENous 2 times per day    sodium chloride flush  5-40 mL IntraVENous 2 times per day    melatonin  3 mg Oral Nightly    ziprasidone  10 mg IntraMUSCular Once    lithium  300 mg Oral BID WC    memantine  10 mg Oral BID    rivastigmine  1 patch TransDERmal Daily    vitamin D  4,000 Units Oral Daily    cefTRIAXone (ROCEPHIN) IV  1,000 mg IntraVENous Q24H     sodium chloride flush, 5-40 mL, PRN  sodium chloride, , PRN  sodium chloride flush, 5-40 mL, PRN  sodium chloride, , PRN  potassium chloride, 40 mEq, PRN   Or  potassium alternative oral replacement, 40 mEq, PRN   Or  potassium chloride, 10 mEq, PRN  magnesium sulfate, 2,000 mg, PRN  ondansetron, 4 mg, Q8H PRN   Or  ondansetron, 4 mg, Q6H PRN  polyethylene glycol, 17 g, Daily PRN  acetaminophen, 650 mg, Q6H PRN   Or  acetaminophen, 650 mg, Q6H PRN  haloperidol lactate, 1 mg, Q6H PRN          Objective:    BP (!) 110/59   Pulse 67   Temp 97.8 °F (36.6 °C) (Temporal)   Resp 16   Ht 1.6 m (5' 3\")   Wt 46.3 kg (102 lb 1.6 oz)   SpO2 97%   BMI 18.09 kg/m²     General Appearance: alert and oriented to person, place and time and in no acute distress  Skin: warm and dry  Head: normocephalic and bilateral periorbital hematoma   Eyes: pupils equal, round, and reactive to light, extraocular eye movements intact, conjunctivae normal  Neck: neck supple and non tender without mass   Pulmonary/Chest: clear to auscultation bilaterally- no wheezes, rales or rhonchi, normal air movement, no respiratory distress  Cardiovascular: normal rate, normal S1 and S2 and no carotid bruits  Abdomen: soft, non-tender, non-distended, normal bowel sounds, no masses or organomegaly  Extremities: no cyanosis, no clubbing and no edema  Neurologic: no cranial nerve deficit and speech normal        Recent Labs     10/11/24  1604 10/13/24  0442    139   K 4.4 4.2    104   CO2 22 24   BUN 20 16   CREATININE 1.0 0.8   GLUCOSE 112* 122*   CALCIUM 10.2 9.9       Recent Labs     10/11/24  1604 10/13/24  0442   WBC 13.5* 12.7*   RBC 3.89 3.96   HGB 12.2 12.2   HCT 36.3 37.0   MCV 93.3 93.4   MCH 31.4 30.8   MCHC 33.6 33.0   RDW 13.1 13.1    296   MPV 9.4 9.8       Radiology:   MRI BRAIN WO CONTRAST   Final Result   1. No acute intracranial abnormality.  No significant extra-axial fluid   collection.   2. Prominent supratentorial and brainstem chronic small-vessel ischemic   changes.         XR CHEST PORTABLE   Final Result   No evidence of acute cardiopulmonary process.         CT Head W/O Contrast   Final Result   1. No acute intracranial abnormality.   2. Generalized atrophy and chronic small vessel ischemic change.   3. Scalp hematoma overlying the left frontal bone and periorbital soft tissue   swelling.         CT CSpine W/O Contrast   Final Result   1. No acute fracture or traumatic malalignment of the cervical

## 2024-10-14 NOTE — OP NOTE
Operative Note  ______________________________________________________________      IR/FL  LUMBAR PUNCTURE  SEYZ 8S CDU    Patient Name: Babs Lewis   YOB: 1945  Medical Record Number: [id number]  Date of Procedure: 10/14/24  Room/Bed: [address] [place], [place] [address]    Preoperative diagnosis: Altered Mental Status and Possible Meningitis    Postoperative diagnosis: Same    CONSENT:  Informed consent was obtained from the daughter via phone verified by 2 additional witnesses prior to the procedure. The details of the procedure, as well is its risks, benefits, and alternatives, were explained. These risks include, but are not limited to, nerve root injury, bleeding, infection (including meningitis), dural leak (possibly requiring blood patch treatment), and spinal headache. Opportunity was provided to ask questions, which were answered.     Procedure: Fluoroscopic-guided lumbar puncture    Anesthesia: Local anesthesia with approximately 5 mL of 1% Lidocaine without epinephrine administered subcutaneously.    Performed by: SANDRA Hartmann under on-site supervision by Luana Umana MD.    Estimated blood loss: None    Complications: none.  Number of attempts, 3.    Implants: None    Procedure details:   A preprocedure timeout was performed. With the patient in a left anterior oblique prone position on the exam table, fluoroscopy was used to select a suitable interlaminar space for lumbar puncture initially at the L4-L5 level.  The patient was difficult to be positioned properly due to severe agitation secondary to altered mental status.  The procedure was temporarily halted.  An order for 1 mg IV Ativan and soft wrist restraints were placed by the ordering provider and administered by the nurse caring for the patient on the unit.  After a few minutes the patient was much more relaxed and the procedure was able to resume.  The site was marked on the skin.  The skin was prepped in traditional fashion.  1% lidocaine without

## 2024-10-14 NOTE — ACP (ADVANCE CARE PLANNING)
Advance Care Planning   Healthcare Decision Maker:    Primary Decision Maker: Mary Anne Dozier - Rachel - [phone]    Secondary Decision Maker: Pallai,Diane - Brother/Sister - [phone]    Click here to complete Healthcare Decision Makers including selection of the Healthcare Decision Maker Relationship (ie \"Primary\").       Electronically signed by Garo Bolaños RN on 10/14/2024 at 9:53 AM

## 2024-10-14 NOTE — PROGRESS NOTES
Department of Internal Medicine  Infectious Diseases  Progress  Note      C/C:   Mental status change , No Meningitis     Discussed with pt's daughter   Pt is more awake and alert  No distress  Afebrile         Current Facility-Administered Medications   Medication Dose Route Frequency Provider Last Rate Last Admin    sodium chloride flush 0.9 % injection 5-40 mL  5-40 mL IntraVENous 2 times per day Kamille Chavarria MD   10 mL at 10/14/24 0839    sodium chloride flush 0.9 % injection 5-40 mL  5-40 mL IntraVENous PRN Kamille Chavarria MD        0.9 % sodium chloride infusion   IntraVENous PRN Kamille Chavarria MD        sodium chloride flush 0.9 % injection 5-40 mL  5-40 mL IntraVENous 2 times per day Mj Covarrubias MD   10 mL at 10/14/24 0838    sodium chloride flush 0.9 % injection 5-40 mL  5-40 mL IntraVENous PRN Mj Covarrubias MD   10 mL at 10/13/24 1111    0.9 % sodium chloride infusion   IntraVENous PRN Mj Covarrubias MD        potassium chloride (KLOR-CON M) extended release tablet 40 mEq  40 mEq Oral PRN Mj Covarrubias MD        Or    potassium bicarb-citric acid (EFFER-K) effervescent tablet 40 mEq  40 mEq Oral PRN Mj Covarrubias MD        Or    potassium chloride 10 mEq/100 mL IVPB (Peripheral Line)  10 mEq IntraVENous PRN Mj Covarrubias MD        magnesium sulfate 2000 mg in 50 mL IVPB premix  2,000 mg IntraVENous PRN Mj Covarrubias MD        ondansetron (ZOFRAN-ODT) disintegrating tablet 4 mg  4 mg Oral Q8H PRN Mj Covarrubias MD        Or    ondansetron (ZOFRAN) injection 4 mg  4 mg IntraVENous Q6H PRN Mj Covarrubias MD        polyethylene glycol (GLYCOLAX) packet 17 g  17 g Oral Daily PRN Mj Covarrubias MD        acetaminophen (TYLENOL) tablet 650 mg  650 mg Oral Q6H PRN Mj Covarrubias MD   650 mg at 10/14/24 1620    Or    acetaminophen (TYLENOL) suppository 650 mg  650 mg Rectal Q6H PRN  LEUKOCYTESUR NEGATIVE 10/11/2024 04:04 PM    UROBILINOGEN 0.2 10/11/2024 04:04 PM    BILIRUBINUR NEGATIVE 10/11/2024 04:04 PM    BLOODU Negative 03/29/2023 11:31 AM    GLUCOSEU NEGATIVE 10/11/2024 04:04 PM       ABG:  No results found for: \"YNJ0YFL\", \"BEART\", \"F5NNDQUK\", \"PHART\", \"THGBART\", \"YSZ0DEJ\", \"PO2ART\", \"ADR5EHK\"    MICROBIOLOGY:        Radiology :    CT scan of head -    Right periorbital and left frontal subcutaneous swelling/hematoma. No   underlying calvarial or orbital fracture detected.     IMPRESSION:     Fall,  periorbital, sub cutaneous hematoma   Leukocytosis   CSF - unremarkable     RECOMMENDATIONS:     Follow CSF pcr test

## 2024-10-14 NOTE — CARE COORDINATION
10/14/24 Transition of care:  Pt admitted OBS from ED for AMS s/p fall MRI is negative Pt scheduled for LP Needed INR prior Met with pt daughter who is at bedside to discuss transition of care Pt is currently at [facility] in [place] d/t her dementia Palliative may be consulted if LP is negative Daughter feels pt will need SNF at discharge List has been provided to daughter CM/SW to follow Electronically signed by Garo Bolaños RN on 10/14/2024 at 10:12 AM     Case Management Assessment  Initial Evaluation    Date/Time of Evaluation: 10/14/2024 10:12 AM  Assessment Completed by: Garo Bolaños RN    If patient is discharged prior to next notation, then this note serves as note for discharge by case management.    Patient Name: Babs Lewis                   YOB: 1945  Diagnosis: Altered mental status [R41.82]  Injury of head, initial encounter [S09.90XA]  Fall, initial encounter [W19.XXXA]  History of dementia [Z86.59]  Acute encephalopathy [G93.40]                   Date / Time: 10/11/2024  1:15 PM    Patient Admission Status: Inpatient   Readmission Risk (Low < 19, Mod (19-27), High > 27): Readmission Risk Score: 9.7    Current PCP: Jesus Mckeon, DO  PCP verified by ? Yes    Chart Reviewed: Yes      History Provided by: Child/Family  Patient Orientation: Unable to Assess    Patient Cognition: Severely Impaired    Hospitalization in the last 30 days (Readmission):  No    If yes, Readmission Assessment in  Navigator will be completed.    Advance Directives:      Code Status: DNR-CCA   Patient's Primary Decision Maker is: Legal Next of Kin    Primary Decision Maker: Mary Anne Dozier - Child - [phone]    Secondary Decision Maker: Pallai,Diane - Brother/Sister - [phone]    Discharge Planning:    Patient lives with: Other (Comment) (other facility) Type of Home: Long-Term Care  Primary Care Giver: Other (Comment) (pt is at memory care facility)  Patient Support Systems  include: Children, Family Members   Current Financial resources:    Current community resources:    Current services prior to admission: Extended Care Placement            Current DME:              Type of Home Care services:  None    ADLS  Prior functional level: Assistance with the following:, Bathing, Dressing, Toileting, Cooking, Housework, Shopping  Current functional level: Assistance with the following:, Bathing, Dressing, Toileting, Cooking, Housework, Shopping    PT AM-PAC:   /24  OT AM-PAC:   /24    Family can provide assistance at DC: No  Would you like Case Management to discuss the discharge plan with any other family members/significant others, and if so, who? Yes  Plans to Return to Present Housing: Unknown at present  Other Identified Issues/Barriers to RETURNING to current housing: yes  Potential Assistance needed at discharge: N/A            Potential DME:    Patient expects to discharge to: Long-term care  Plan for transportation at discharge:      Financial    Payor: [employer] MEDICARE / Plan: [employer]ET[employer] MEDICARE-ADVANTAGE PPO / Product Type: Medicare /     Does insurance require precert for SNF: Yes    Potential assistance Purchasing Medications:    Meds-to-Beds request: Yes      [id number] - [place], [place] - [address] [phone] - F [phone]  [address] [address]  Phone: [phone] Fax: [phone]      Notes:    Factors facilitating achievement of predicted outcomes: Family support and Caregiver support    Barriers to discharge: Cognitive deficit and Limited safety awareness    Additional Case Management Notes: see notes    The Plan for Transition of Care is related to the following treatment goals of Altered mental status [R41.82]  Injury of head, initial encounter [S09.90XA]  Fall, initial encounter [W19.XXXA]  History of dementia [Z86.59]  Acute encephalopathy [G93.40]        The Patient and/or Patient Representative Agree with the Discharge Plan?   yes    Garo Bolaños RN  Case Management Department  Ph: [phone] Fax: na

## 2024-10-14 NOTE — PROGRESS NOTES
Occupational Therapy  OT consult received to eval/treat and chart review complete. Attempted OT evaluation, patient underwent lumbar puncture and is to remain in bed at this time. OT to re-attempt at a later date/time as able and appropriate.     Simi Diaz OTR/L  License Number: [id number]

## 2024-10-15 PROBLEM — Z51.5 PALLIATIVE CARE ENCOUNTER: Status: ACTIVE | Noted: 2024-10-15

## 2024-10-15 PROBLEM — Z71.89 GOALS OF CARE, COUNSELING/DISCUSSION: Status: ACTIVE | Noted: 2024-10-15

## 2024-10-15 NOTE — CONSULTS
Palliative Care Department  [phone]  Palliative Care Initial Consult  Rosmery Jo APRN-CNS    Babs Lewis  [id number]  Hospital Day: 5    Date of Initial Consult: 10/15/24  Referring Provider: Dr. Itz Mendoza    Palliative Medicine was consulted for assistance with: goals of care    HPI:   Babs Lewis is a 79 y.o. with a past medical history of dementia who was admitted on 10/11/2024 from [facility] with a CHIEF COMPLAINT of Altered mental status.  The patient took a fall 2 days at the nursing home; suffered some facial bruising and since has had persistent altered mental status.  Son reports at baseline pt is pleasantly demented but has gotten progressively gotten more confused since her fall.  Workup imaging; CT head, CT facial bones without contrast, CT cervical spine was obtained in the ED noted scalp hematoma overlying the left frontal bone improved to soft tissue mass with and right periorbital edema.  No fractures.  Urinalysis noted 1+ bacteria but no WBC nitrites or leukocyte Estrace.  Chest x-ray with no acute findings. Patient is barely oriented to self only. LP done-per ID; no meningitis.  MRI of brain- no acute findings.   .   ASSESSMENT/PLAN:     Pertinent Hospital Diagnoses   Altered mental status  Fall  Scalp hematoma  Right periorbital edema  Hx of dementia  Acute on chronic encephalopathy     Palliative Care Encounter / Counseling Regarding Goals of Care  Babs Lewis, Does Not have capacity for medical decision-making.  Capacity is time limited and situation/question specific  During encounter met with gerber Gandhi as surrogate medical decision-maker  Outcome of goals of care meeting:   Continue current care  Possible hospice vs  rehab on discharge  Code status DNR-CCA  Advanced Directives: HCPOA -gerber Gandhi- not in Epic  Surrogate/Legal NOK:  Primary decision maker Gerber Dozier @ [phone]   Daughter braulio Panchal @    Emotional support offered to both son and Daughter.   Both were appreciative.   Palliative Medicine will continue to assist as needed.         OBJECTIVE:   Prognosis: Guarded    ----- REVIEW OF SYSTEMS -----  Pt unable to answer ROS questions    Physical Exam:  BP (!) 173/94   Pulse (!) 102   Temp 97.2 °F (36.2 °C) (Temporal)   Resp 16   Ht 1.6 m (5' 3\")   Wt 46.3 kg (102 lb 1.6 oz)   SpO2 98%   BMI 18.09 kg/m²   Gen:   pt not verbal; responds to name at times  HEENT:  multiple areas of bruising on face; scalp hematoma  Neck:  Supple, trachea midline, no JVD  Lungs:  CTA bilaterally, no audible rhonchi or wheezes noted, respirations unlabored  Heart:  RRR, distant heart tones, no murmur, rub, or gallop noted during exam  Abd:  Soft, non tender, non distended, bowel sounds present  :  urinates  Ext:  Moving all extremities, no edema, pulses present  Skin:  Warm and dry  Neuro:  Pt not responsive to this provided; not following commands; not verbal    Past Medical History:   Diagnosis Date    Mastitis in female      Past Surgical History:   Procedure Laterality Date    BREAST LUMPECTOMY  1993     Active Hospital Problems    Diagnosis Date Noted    Acute encephalopathy [G93.40] 10/14/2024    Fall [W19.XXXA] 10/12/2024    Head injury [S09.90XA] 10/12/2024    Altered mental status [R41.82] 10/11/2024       Social History:   The patient currently lives at nursing facility  TOBACCO:  reports that she has never smoked. She does not have any smokeless tobacco history on file.  ETOH:  reports current alcohol use of about 2.0 standard drinks of alcohol per week.      Objective data reviewed: labs, images, records, medication use, vitals, and chart    Discussed patient and the plan of care with the other IDT members: Palliative Medicine IDT Team, Floor Nurse, Patient, and Family    Time/Communication  Greater than 50% of time spent, total 45 minutes in counseling and coordination of care at the bedside regarding

## 2024-10-15 NOTE — ACP (ADVANCE CARE PLANNING)
Advance Care Planning     Palliative Team Advance Care Planning (ACP) Conversation    Date of Conversation: 10/15/24    Individuals present for the conversation: Legal healthcare agent named below and Son Casey     ACP documents on file prior to discussion:  -Portable DNR    Previously completed document/s not on file:  HCPOA document was completed previously but it is not available for review. This writer requested a copy of the document for patient's chart.     Healthcare Decision Maker:    Primary Decision Maker: Mary Anne Dozier - Child - [phone]     Conversation Summary:  Met with Mary Anne and spoke with Casey via telephone. Another daughter Maranda is unavailable currently. Family reports Mary Anne is HCPOA but they make joint decisions.     Resuscitation Status:   Code Status: DNR-CCA     Documentation Completed:  -Patient/surrogate was asked to submit existing ACP documents for our records    I spent 20 minutes with the patient and/or surrogate decision maker discussing the patient's wishes and goals.      NADER Choudhary

## 2024-10-15 NOTE — CARE COORDINATION
Transition of care update. Per attending  today, acute on chronic encephalopathy in the setting of dementia - baseline intermittently conversant, follows commands, slowly declining but had very acute changes over past week. MRI and LP with no acute abnormalities,. PCR pending from CSF.   ID is following. Met with daughter at the bedside for rehab preferences from list given yesterday. Preferences for [facility], with referral sent. Second choice is [facility]. Await if [facility] is able to accept. Palliative is a new consult for goals of care discussion, with consideration of hospice vs rehab with memory care unit. CM/SW will follow.  Electronically signed by Garo Garza RN on 10/15/2024 at 9:51 AM  Addendum: Accepted at [facility]. Ilsa in CM to start precert. PASRR and ambulance form is in the envelope on the soft chart. NOEMI and destination updated.  Daughter took list for hospice, and will possibly want at a later date. She stated they wanted to try rehab first. MB

## 2024-10-15 NOTE — PLAN OF CARE
Problem: Safety - Adult  Goal: Free from fall injury  10/15/2024 0352 by Flory Montiel RN  Outcome: Progressing  10/14/2024 1539 by Katelyn Nieto RN  Outcome: Progressing     Problem: ABCDS Injury Assessment  Goal: Absence of physical injury  10/15/2024 0352 by Flory Montiel RN  Outcome: Progressing  10/14/2024 1539 by Katelyn Nieto RN  Outcome: Progressing     Problem: Skin/Tissue Integrity  Goal: Absence of new skin breakdown  Description: 1.  Monitor for areas of redness and/or skin breakdown  2.  Assess vascular access sites hourly  3.  Every 4-6 hours minimum:  Change oxygen saturation probe site  4.  Every 4-6 hours:  If on nasal continuous positive airway pressure, respiratory therapy assess nares and determine need for appliance change or resting period.  10/15/2024 0352 by Flory Montiel RN  Outcome: Progressing  10/14/2024 1539 by Katelyn Nieto RN  Outcome: Progressing     Problem: Pain  Goal: Verbalizes/displays adequate comfort level or baseline comfort level  Outcome: Progressing

## 2024-10-15 NOTE — PROGRESS NOTES
Palliative Medicine Social Work     Patient Name: Babs Lewis    Age: 79 y.o.    Marital Status:      Status: no    Next of Kin: daughter Mary Anne Dozier-hcpoa requested copy    Additional Support: son Casey Dozier, daughter Maranda Panchal    Minor Children: no     Advanced Directives: daughter Mary Anne dillard she is HCPOA, requested copy for chart    Confirm Code Status: dnrcca    Current Goals of Care:discussed anticipated decline with dementia, family plans rehab for short term. Discussed palliative care/hospice at facility and family would like more information on this Care coordinator to choice family.    Mental Health History: dementia with behavioral disturbances- she was hospitalized for medication adjustment this year and responded well to it until the past several weeks    Substance Abuse:no    Indications of Abuse/Neglect: no    Financial Concerns: no, retired school teach, taught English and Martiniquais    Living Situation: [facility]    Physical Care Needs Met: yes     Emotional Needs Met: guided discussion and supportive listening provided to Mary Anne and Casey as we discussed decline associated with dementia    Referral Needs: palliative care/hospice at facility and family would like more information on this Care coordinator to choice family.    Assessment: 78 yo  female admitted from [facility] with altered mental status, worse after a fall last week. She mane shave dementia. Family planning snf placement and would like information about palliative care and hospice at facility.

## 2024-10-15 NOTE — PROGRESS NOTES
[facility] Hospitalist Progress Note    Admitting Date and Time: 10/11/2024  1:15 PM  Admit Dx: Altered mental status [R41.82]  Injury of head, initial encounter [S09.90XA]  Fall, initial encounter [W19.XXXA]  History of dementia [Z86.59]  Acute encephalopathy [G93.40]    Synopsis: The patient is an 79-year-old old female with past medical history of  dementia who came from a nursing home for severe progressive confusion and altered mental status.  Son was at bedside providing history.  Steadily declining over the past few weeks but has become worse over the past few days and very acute progression per son. Patient noted to have hip flexion and refusing to lie down which is new for her. CT head negative for acute intracranial abnormalities apart from periorbital hematoma. MRI pending.  No Fevers, with slightly elevated WBC. UA negative for pyuria. Await MRI. ID consulted for Sepsis, concern for meningitis. LP on 10/14, initial cytology low concern for infection.     Subjective:  Patient seen and examined today.   Slightly more responsive.     ROS: denies fever, chills, cp, sob, n/v, HA unless stated above.      sodium chloride flush  5-40 mL IntraVENous 2 times per day    sodium chloride flush  5-40 mL IntraVENous 2 times per day    melatonin  3 mg Oral Nightly    ziprasidone  10 mg IntraMUSCular Once    lithium  300 mg Oral BID WC    memantine  10 mg Oral BID    rivastigmine  1 patch TransDERmal Daily    vitamin D  4,000 Units Oral Daily     sodium chloride flush, 5-40 mL, PRN  sodium chloride, , PRN  sodium chloride flush, 5-40 mL, PRN  sodium chloride, , PRN  potassium chloride, 40 mEq, PRN   Or  potassium alternative oral replacement, 40 mEq, PRN   Or  potassium chloride, 10 mEq, PRN  magnesium sulfate, 2,000 mg, PRN  ondansetron, 4 mg, Q8H PRN   Or  ondansetron, 4 mg, Q6H PRN  polyethylene glycol, 17 g, Daily PRN  acetaminophen, 650 mg, Q6H PRN   Or  acetaminophen, 650 mg, Q6H PRN  haloperidol lactate,

## 2024-10-15 NOTE — DISCHARGE INSTR - COC
Continuity of Care Form    Patient Name: Babs Lewis   :  1945  MRN:  [id number]    Admit date:  10/11/2024  Discharge date:  10/16/2024    Code Status Order: DNR-CCA   Advance Directives:   Advance Care Flowsheet Documentation             Admitting Physician:  Mj Covarrubias MD  PCP: Jesus Mckeon DO    Discharging Nurse: Kimberly Jernigan RN  Discharging Hospital Unit/Room#: 8202/8202-A  Discharging Unit Phone Number: [phone]    Emergency Contact:   Extended Emergency Contact Information  Primary Emergency Contact: Pallai,Diane  Home Phone: [phone]  Mobile Phone: [phone]  Relation: Brother/Sister  Secondary Emergency Contact: Mary Anne Dozier  Home Phone: [phone]  Mobile Phone: [phone]  Relation: Child    Past Surgical History:  Past Surgical History:   Procedure Laterality Date    BREAST LUMPECTOMY         Immunization History:     There is no immunization history on file for this patient.    Active Problems:  Patient Active Problem List   Diagnosis Code    COVID-19 U07.1    Acute kidney failure (HCC) N17.9    Severe protein-calorie malnutrition (HCC) E43    Altered mental status R41.82    Fall W19.XXXA    Head injury S09.90XA    Acute encephalopathy G93.40    Palliative care encounter Z51.5    Goals of care, counseling/discussion Z71.89       Isolation/Infection:   Isolation            No Isolation          Patient Infection Status       Infection Onset Added Last Indicated Last Indicated By Review Planned Expiration Resolved Resolved By    None active    Resolved    C-diff (Clostridium difficile) 24 Clostridium Difficile Toxin/Antigen   24 Leyla Avalos    Negative result    COVID-19 23 COVID-19, Rapid   23 Infection                        Nurse Assessment:  Last Vital Signs: BP (!) 173/94   Pulse (!) 102   Temp 97.2 °F (36.2 °C) (Temporal)   Resp 16   Ht 1.6 m (5' 3\")   Wt 46.3 kg (102 lb 1.6  oz)   SpO2 98%   BMI 18.09 kg/m²     Last documented pain score (0-10 scale): Pain Level: 2  Last Weight:   Wt Readings from Last 1 Encounters:   10/14/24 46.3 kg (102 lb 1.6 oz)     Mental Status:  alert and disoriented    IV Access:  - None    Nursing Mobility/ADLs:  Walking   Dependent  Transfer  Dependent  Bathing  Dependent  Dressing  Dependent  Toileting  Dependent  Feeding  Dependent  Med Admin  Dependent  Med Delivery   pills in applesauce    Wound Care Documentation and Therapy:        Elimination:  Continence:   Bowel: No  Bladder: No  Urinary Catheter: None   Colostomy/Ileostomy/Ileal Conduit: No       Date of Last BM: 10/14/2024    Intake/Output Summary (Last 24 hours) at 10/15/2024 1611  Last data filed at 10/15/2024 0625  Gross per 24 hour   Intake 200 ml   Output --   Net 200 ml     I/O last 3 completed shifts:  In: 380 [P.O.:380]  Out: 0     Safety Concerns:     Sundowners Sundrome, History of Falls (last 30 days), and At Risk for Falls    Impairments/Disabilities:      None    Nutrition Therapy:  Current Nutrition Therapy:   - Oral Diet:  General    Routes of Feeding: Oral  Liquids: No Restrictions  Daily Fluid Restriction: no  Last Modified Barium Swallow with Video (Video Swallowing Test): not done    Treatments at the Time of Hospital Discharge:   Respiratory Treatments: n/a  Oxygen Therapy:  is not on home oxygen therapy.  Ventilator:    - No ventilator support    Rehab Therapies: n/a  Weight Bearing Status/Restrictions: No weight bearing restrictions  Other Medical Equipment (for information only, NOT a DME order):  n/a  Other Treatments: n/a    Patient's personal belongings (please select all that are sent with patient):      RN SIGNATURE:  Electronically signed by Kimberly Jernigan RN on 10/16/24 at 10:37 AM EDT    CASE MANAGEMENT/SOCIAL WORK SECTION    Inpatient Status Date: ***    Readmission Risk Assessment Score:  Readmission Risk              Risk of Unplanned Readmission:  8

## 2024-10-15 NOTE — PROGRESS NOTES
.Lighting rounds was done on pt . Educated pt on fall risk. Provided patient with yellow gripper socks and fall risk wristband.

## 2024-10-15 NOTE — PROGRESS NOTES
OCCUPATIONAL THERAPY INITIAL EVALUATION    [facility]  [address], [place]      Date:10/15/2024                                                  Patient Name: Babs Lewis  MRN: [id number]  : 1945  Room: [address] [place] [address]    Evaluating OT: KIN Chamberlain, OTR/L  # [id number]    Referring Provider:  Mj Covarrubias MD   Specific Provider Orders:  \"OT Eval and Treat\"  10-12-24    Diagnosis: Altered mental status [R41.82]  Injury of head, initial encounter [S09.90XA]  Fall, initial encounter [W19.XXXA]  History of dementia [Z86.59]  Acute encephalopathy [G93.40]    Pt was admitted after falling at Dementia Unit striking Head/Face, no fractures    Pertinent Medical History:  Pt has a past medical history of Mastitis in female.,  has a past surgical history that includes Breast lumpectomy ().    Surgeries this admission: 10-14-24:  Lumbar Puncture     Precautions:  Fall Risk  Cognition - Alarms,   Head/Facial Hematoma  Incontinent/External Catheter    Assessment of current deficits   [x] Functional mobility  [x]ADLs  [x] Strength               []Cognition   [x] Functional transfers   [x] IADLs         [x] Safety Awareness   [x]Endurance   [] Fine Coordination              [x] Balance     [] Vision/perception   []Sensation    []Gross Motor Coordination  [] ROM  [] Delirium                  [] Motor Control       OT PLAN OF CARE   OT POC based on physician orders, patient diagnosis and results of clinical assessment    Frequency/Duration 1-3 days/wk for 2 weeks PRN   Specific OT Treatment to include:   * Instruction/training on adapted ADL techniques and AE recommendations to increase functional independence within precautions       * Training on energy conservation strategies, correct breathing pattern and techniques to improve independence/tolerance for self-care routine  * Functional transfer/mobility training/DME  increase safety and independence with completion of ADL/IADL tasks for functional independence and quality of life.    Treatment: OT treatment provided this date includes:   Instruction/training on safety and adapted techniques for completion of ADLs   Instruction/training on safe functional mobility/transfer techniques      Skilled positioning/alignment for Pain Mgmt, to maximize Pt's safety and ability to safely and INDly interact w/ his/her environment, maximize respiratory status  Activity tolerance - Sitting to improve endurance w/ functional ax   Cognitive retraining -  Oriented pt to current Date, Place and Situation; Cues for safety/safety awareness, sequencing, problem solving    Skilled monitoring of pt's response to tx ax      Pt/family ed re: benefits of participate in post-acute therapy program;    Consulted RN. Family, Nsg staff    Made all appropriate Environmental Modifications to facilitate pt's level of IND and safety.    Recommendations for Continued Participation in OT services during Hospitalization and at D/C    Pt and/or Family verbalized/demonstrated a Poor understanding of education provided.  Will Review PRN.         Rehab Potential:Limited for established goals     Patient / Family Goal: Not stated at this time      Patient and/or family were instructed on functional diagnosis, prognosis/goals and OT plan of care. Demonstrated Poor understanding.     Eval Complexity: Low    Time In: 1038  Time Out: 1106  Total Treatment Time: 13 minutes    Min Units   OT Eval Low 97165  X  1   OT Eval Medium 72841      OT Eval High 70513      OT Re-Eval 80030       Therapeutic Ex 78088       Therapeutic Activities 55322       ADL/Self Care 66197  13  1   Orthotic Management 67254       Manual 03267     Neuro Re-Ed 62712       Non-Billable Time              Evaluation Time additionally includes thorough review of current medical information, gathering information on past medical history/social history and

## 2024-10-15 NOTE — PROGRESS NOTES
4 Eyes Skin Assessment     NAME:  Babs Lewis  YOB: 1945  MEDICAL RECORD NUMBER:  [id number]    The patient is being assessed for  Admission    I agree that at least one RN has performed a thorough Head to Toe Skin Assessment on the patient. ALL assessment sites listed below have been assessed.      Areas assessed by both nurses:    Head, Face, Ears, Shoulders, Back, Chest, Arms, Elbows, Hands, Sacrum. Buttock, Coccyx, Ischium, Legs. Feet and Heels, and Under Medical Devices         Does the Patient have a Wound? No noted wound(s)       Valentin Prevention initiated by RN: Yes  Wound Care Orders initiated by RN: No    Pressure Injury (Stage 3,4, Unstageable, DTI, NWPT, and Complex wounds) if present, place Wound referral order by RN under : No    New Ostomies, if present place, Ostomy referral order under : No     Nurse 1 eSignature: Electronically signed by Flory Montiel RN on 10/15/24 at 6:54 PM EDT    **SHARE this note so that the co-signing nurse can place an eSignature**    Nurse 2 eSignature: {Esignature:[id number]}

## 2024-10-16 NOTE — DISCHARGE SUMMARY
[facility] Hospitalist Physician Discharge Summary       [facility]  [address] [address] [phone]          Activity level: As tolerated     Dispo: SNF     Condition on discharge: Stable     Patient ID:  Babs Lewis  [id number]  79 y.o.  1945    Admit date: 10/11/2024    Discharge date and time:  10/16/2024  9:38 AM    Admission Diagnoses: Principal Problem:    Altered mental status  Active Problems:    Fall    Head injury    Acute encephalopathy    Palliative care encounter    Goals of care, counseling/discussion  Resolved Problems:    * No resolved hospital problems. *      Discharge Diagnoses: Principal Problem:    Altered mental status  Active Problems:    Fall    Head injury    Acute encephalopathy    Palliative care encounter    Goals of care, counseling/discussion  Resolved Problems:    * No resolved hospital problems. *      Consults:  IP CONSULT TO INTERNAL MEDICINE  IP CONSULT TO INFECTIOUS DISEASES  IP CONSULT TO PALLIATIVE CARE  IP CONSULT TO HOSPICE    Hospital Course:   The patient is an 79-year-old old female with past medical history of  dementia who came from a nursing home for severe progressive confusion and altered mental status.  Son was at bedside providing history.  Steadily declining over the past few weeks but has become worse over the past few days and very acute progression per son. Patient noted to have hip flexion and refusing to lie down which is new for her. CT head negative for acute intracranial abnormalities apart from periorbital hematoma. MRI pending.  No Fevers, with slightly elevated WBC. UA negative for pyuria. MRI unremarkable apart from chronic changes. ID consulted for Sepsis, concern for meningitis. LP on 10/14, initial cytology low concern for infection. Plan is for patient to go to [facility] and patient's daughter will be transitioning to palliative care from there.     Discharge Exam:  General  maintained without evidence of an acute infarct.  There is no evidence of hydrocephalus. ORBITS: The visualized portion of the orbits demonstrate no acute abnormality. SINUSES: The visualized paranasal sinuses and mastoid air cells demonstrate no acute abnormality. SOFT TISSUES/SKULL:  No acute abnormality of the visualized skull.  Scalp hematoma overlying the left frontal bone and periorbital soft tissue swelling.     1. No acute intracranial abnormality. 2. Generalized atrophy and chronic small vessel ischemic change. 3. Scalp hematoma overlying the left frontal bone and periorbital soft tissue swelling.       Patient Instructions:      Medication List        CHANGE how you take these medications      Dextromethorphan-buPROPion ER  MG Tbcr  What changed: Another medication with the same name was removed. Continue taking this medication, and follow the directions you see here.            CONTINUE taking these medications      ferrous sulfate 325 (65 Fe) MG tablet  Commonly known as: IRON 325  Take 1 tablet by mouth daily (with breakfast)     hydrOXYzine HCl 25 MG tablet  Commonly known as: ATARAX     ibuprofen 200 MG tablet  Commonly known as: ADVIL;MOTRIN     lithium 300 MG capsule     loperamide 2 MG capsule  Commonly known as: IMODIUM     losartan 25 MG tablet  Commonly known as: COZAAR     memantine 10 MG tablet  Commonly known as: NAMENDA     rivastigmine 13.3 MG/24HR  Commonly known as: EXELON     vitamin D3 125 MCG (5000 UT) Tabs tablet  Commonly known as: CHOLECALCIFEROL                Note that more than 30 minutes was spent in preparing discharge papers, discussing discharge with patient, medication review, etc.    Signed:  Electronically signed by Kamille Greer MD on 10/16/2024 at 9:38 AM

## 2024-10-16 NOTE — CARE COORDINATION
10/16/24 Discharge order noted Precert was obtained Plan is [facility] set up for 11am Lucinda/liaison, daughter and BSRN notified of time Electronically signed by Garo Bolaños RN on 10/16/2024 at 9:52 AM     Ilsa bravo fax approval to Lucinda at [facility] at [phone] Electronically signed by Garo Bolaños RN on 10/16/2024 at 9:54 AM

## 2024-10-16 NOTE — PROGRESS NOTES
Department of Internal Medicine  Infectious Diseases  Progress  Note      C/C:   Mental status change , No Meningitis     Discussed with pt's daughter   Pt is more awake and alert, responded when called this morning per daughter  No distress  Afebrile         Current Facility-Administered Medications   Medication Dose Route Frequency Provider Last Rate Last Admin    sodium chloride flush 0.9 % injection 5-40 mL  5-40 mL IntraVENous 2 times per day Kamille Chavarria MD   10 mL at 10/15/24 2021    sodium chloride flush 0.9 % injection 5-40 mL  5-40 mL IntraVENous PRN Kamille Chavarria MD        0.9 % sodium chloride infusion   IntraVENous PRN Kamille Chavarria MD        sodium chloride flush 0.9 % injection 5-40 mL  5-40 mL IntraVENous 2 times per day Mj Covarrubias MD   10 mL at 10/15/24 1034    sodium chloride flush 0.9 % injection 5-40 mL  5-40 mL IntraVENous PRN Mj Covarrubias MD   10 mL at 10/13/24 1111    0.9 % sodium chloride infusion   IntraVENous PRN Mj Covarrubias MD        potassium chloride (KLOR-CON M) extended release tablet 40 mEq  40 mEq Oral PRN Mj Covarrubias MD        Or    potassium bicarb-citric acid (EFFER-K) effervescent tablet 40 mEq  40 mEq Oral PRN Mj Covarrubias MD        Or    potassium chloride 10 mEq/100 mL IVPB (Peripheral Line)  10 mEq IntraVENous PRN Mj Covarrubias MD        magnesium sulfate 2000 mg in 50 mL IVPB premix  2,000 mg IntraVENous PRN Mj Covarrubias MD        ondansetron (ZOFRAN-ODT) disintegrating tablet 4 mg  4 mg Oral Q8H PRN Mj Covarrubias MD        Or    ondansetron (ZOFRAN) injection 4 mg  4 mg IntraVENous Q6H PRN Mj Covarrubias MD        polyethylene glycol (GLYCOLAX) packet 17 g  17 g Oral Daily PRN Mj Covarrubias MD        acetaminophen (TYLENOL) tablet 650 mg  650 mg Oral Q6H PRN Mj Covarrubias MD   650 mg at 10/14/24 1620    Or    acetaminophen  NEGATIVE 10/11/2024 04:04 PM    UROBILINOGEN 0.2 10/11/2024 04:04 PM    BILIRUBINUR NEGATIVE 10/11/2024 04:04 PM    BLOODU Negative 03/29/2023 11:31 AM    GLUCOSEU NEGATIVE 10/11/2024 04:04 PM       ABG:  No results found for: \"FEO8WKA\", \"BEART\", \"S0ECHWOG\", \"PHART\", \"THGBART\", \"VYF4UFY\", \"PO2ART\", \"XMZ3OLX\"    MICROBIOLOGY:        Radiology :    CT scan of head -    Right periorbital and left frontal subcutaneous swelling/hematoma. No   underlying calvarial or orbital fracture detected.     IMPRESSION:     Fall,  periorbital, sub cutaneous hematoma   Leukocytosis   CSF - unremarkable     RECOMMENDATIONS:     Follow CSF pcr test

## 2024-10-16 NOTE — PLAN OF CARE
Problem: Safety - Adult  Goal: Free from fall injury  10/16/2024 1027 by Kimberly Jernigan RN  Outcome: Adequate for Discharge  10/16/2024 0041 by Flory Montiel RN  Outcome: Progressing     Problem: ABCDS Injury Assessment  Goal: Absence of physical injury  10/16/2024 1027 by Kimberly Jernigan RN  Outcome: Adequate for Discharge  10/16/2024 0041 by Flory Montiel RN  Outcome: Progressing     Problem: Skin/Tissue Integrity  Goal: Absence of new skin breakdown  Description: 1.  Monitor for areas of redness and/or skin breakdown  2.  Assess vascular access sites hourly  3.  Every 4-6 hours minimum:  Change oxygen saturation probe site  4.  Every 4-6 hours:  If on nasal continuous positive airway pressure, respiratory therapy assess nares and determine need for appliance change or resting period.  10/16/2024 1027 by Kimberly Jernigan RN  Outcome: Adequate for Discharge  10/16/2024 0041 by Flory Montiel RN  Outcome: Progressing     Problem: Pain  Goal: Verbalizes/displays adequate comfort level or baseline comfort level  10/16/2024 1027 by Kimberly Jernigan RN  Outcome: Adequate for Discharge  10/16/2024 0041 by Flory Montiel RN  Outcome: Progressing

## 2024-10-16 NOTE — PROGRESS NOTES
Spiritual Health History and Assessment/Progress Note  [facility]    Palliative Care, Spiritual/Emotional Needs,  ,  ,      Name: Babs Lewis MRN: [id number]    Age: 79 y.o.     Sex: female   Language: English   Buddhist: Anabaptist   Altered mental status     Date: 10/16/2024                           Spiritual Assessment began in [facility]        Referral/Consult From: Rounding   Encounter Overview/Reason: Palliative Care, Spiritual/Emotional Needs  Service Provided For: Patient    Phuong, Belief, Meaning:   Patient is connected with a phuong tradition or spiritual practice  Family/Friends are connected with a phuong tradition or spiritual practice      Importance and Influence:  Patient has spiritual/personal beliefs that influence decisions regarding their health  Family/Friends have spiritual/personal beliefs that influence decisions regarding the patient's health    Community:  Patient is connected with a spiritual community  Family/Friends are connected with a spiritual community:    Assessment and Plan of Care:     Patient Interventions include: Facilitated expression of thoughts and feelings  Family/Friends Interventions include: Facilitated expression of thoughts and feelings    Patient Plan of Care: Spiritual Care available upon further referral  Family/Friends Plan of Care: Spiritual Care available upon further referral    Electronically signed by Chaplain Junior on 10/16/2024 at 8:30 AM

## 2024-10-29 NOTE — PROGRESS NOTES
Physician Progress Note      PATIENT:               NAKIA YOUNG  [facility] #:                  [id number]  :                       1945  ADMIT DATE:       10/11/2024 1:15 PM  DISCH DATE:        10/16/2024 4:28 PM  RESPONDING  PROVIDER #:        Kamille Greer MD          QUERY TEXT:    Pt admitted with worsening confusion. Pt noted to have dementia. If possible,   please document in the progress notes and discharge summary if you are   evaluating and / or treating any of the following:    The medical record reflects the following:  Risk Factors: AMS  Clinical Indicators: per H&P Son at bedside mentioned that at baseline she is   pleasantly demented but over the last 2 days she has progressively gotten more   confused and decline prior to her fall; Progress note 10/13 Acute on chronic   encephalopathy in the setting of dementia lowly declining but had very acute   changes over past week; If LP is unrevealing and infectious cause of   encephalopathy ruled out I explained this acute decline might be all be a   component of her dementia  Treatment: ID consult, LP, Palliative care consult  Options provided:  -- AMS and worsening confusion due to dementia  -- AMS and worsening confusion unrelated to dementia and due to other, Please   specify other cause  -- Other - I will add my own diagnosis  -- Disagree - Not applicable / Not valid  -- Disagree - Clinically unable to determine / Unknown  -- Refer to Clinical Documentation Reviewer    PROVIDER RESPONSE TEXT:    This patient has AMS and worsening confusion due to dementia    Query created by: Dunia Kaba on 10/21/2024 10:14 AM      Electronically signed by:  Kamille Greer MD 10/29/2024 11:00 AM